# Patient Record
Sex: FEMALE | Race: WHITE | NOT HISPANIC OR LATINO | Employment: OTHER | URBAN - METROPOLITAN AREA
[De-identification: names, ages, dates, MRNs, and addresses within clinical notes are randomized per-mention and may not be internally consistent; named-entity substitution may affect disease eponyms.]

---

## 2017-03-13 DIAGNOSIS — R73.9 HYPERGLYCEMIA: ICD-10-CM

## 2017-03-13 DIAGNOSIS — E78.5 HYPERLIPIDEMIA, UNSPECIFIED HYPERLIPIDEMIA TYPE: Primary | ICD-10-CM

## 2017-03-13 DIAGNOSIS — I10 ESSENTIAL HYPERTENSION: ICD-10-CM

## 2017-03-22 LAB
ALBUMIN SERPL-MCNC: 4.1 G/DL (ref 3.5–5.2)
ALBUMIN/GLOB SERPL: 1.4 G/DL
ALP SERPL-CCNC: 135 U/L (ref 39–117)
ALT SERPL-CCNC: 29 U/L (ref 1–33)
AST SERPL-CCNC: 22 U/L (ref 1–32)
BASOPHILS # BLD AUTO: 0.02 10*3/MM3 (ref 0–0.2)
BASOPHILS NFR BLD AUTO: 0.3 % (ref 0–1.5)
BILIRUB SERPL-MCNC: 0.5 MG/DL (ref 0.1–1.2)
BUN SERPL-MCNC: 13 MG/DL (ref 8–23)
BUN/CREAT SERPL: 21.3 (ref 7–25)
CALCIUM SERPL-MCNC: 9.4 MG/DL (ref 8.6–10.5)
CHLORIDE SERPL-SCNC: 107 MMOL/L (ref 98–107)
CHOLEST SERPL-MCNC: 158 MG/DL (ref 100–199)
CO2 SERPL-SCNC: 21.2 MMOL/L (ref 22–29)
CREAT SERPL-MCNC: 0.61 MG/DL (ref 0.57–1)
EOSINOPHIL # BLD AUTO: 0.19 10*3/MM3 (ref 0–0.7)
EOSINOPHIL NFR BLD AUTO: 2.8 % (ref 0.3–6.2)
ERYTHROCYTE [DISTWIDTH] IN BLOOD BY AUTOMATED COUNT: 15 % (ref 11.7–13)
GLOBULIN SER CALC-MCNC: 2.9 GM/DL
GLUCOSE SERPL-MCNC: 93 MG/DL (ref 65–99)
HBA1C MFR BLD: 5.7 % (ref 4.8–5.6)
HCT VFR BLD AUTO: 38.1 % (ref 35.6–45.5)
HDL SERPL-SCNC: 47.4 UMOL/L
HDLC SERPL-MCNC: 81 MG/DL
HGB BLD-MCNC: 12.3 G/DL (ref 11.9–15.5)
IMM GRANULOCYTES # BLD: 0 10*3/MM3 (ref 0–0.03)
IMM GRANULOCYTES NFR BLD: 0 % (ref 0–0.5)
LDL SERPL QN: 21 NM
LDL SERPL-SCNC: 916 NMOL/L
LDL SMALL SERPL-SCNC: 419 NMOL/L
LDLC SERPL CALC-MCNC: 59 MG/DL (ref 0–99)
LYMPHOCYTES # BLD AUTO: 1.41 10*3/MM3 (ref 0.9–4.8)
LYMPHOCYTES NFR BLD AUTO: 20.6 % (ref 19.6–45.3)
MCH RBC QN AUTO: 30.6 PG (ref 26.9–32)
MCHC RBC AUTO-ENTMCNC: 32.3 G/DL (ref 32.4–36.3)
MCV RBC AUTO: 94.8 FL (ref 80.5–98.2)
MONOCYTES # BLD AUTO: 0.54 10*3/MM3 (ref 0.2–1.2)
MONOCYTES NFR BLD AUTO: 7.9 % (ref 5–12)
NEUTROPHILS # BLD AUTO: 4.67 10*3/MM3 (ref 1.9–8.1)
NEUTROPHILS NFR BLD AUTO: 68.4 % (ref 42.7–76)
PLATELET # BLD AUTO: 336 10*3/MM3 (ref 140–500)
POTASSIUM SERPL-SCNC: 4.1 MMOL/L (ref 3.5–5.2)
PROT SERPL-MCNC: 7 G/DL (ref 6–8.5)
RBC # BLD AUTO: 4.02 10*6/MM3 (ref 3.9–5.2)
SODIUM SERPL-SCNC: 145 MMOL/L (ref 136–145)
TRIGL SERPL-MCNC: 91 MG/DL (ref 0–149)
TSH SERPL DL<=0.005 MIU/L-ACNC: 2.63 MIU/ML (ref 0.27–4.2)
WBC # BLD AUTO: 6.83 10*3/MM3 (ref 4.5–10.7)

## 2017-03-28 ENCOUNTER — OFFICE VISIT (OUTPATIENT)
Dept: FAMILY MEDICINE CLINIC | Facility: CLINIC | Age: 77
End: 2017-03-28

## 2017-03-28 VITALS
SYSTOLIC BLOOD PRESSURE: 140 MMHG | OXYGEN SATURATION: 97 % | HEART RATE: 69 BPM | DIASTOLIC BLOOD PRESSURE: 80 MMHG | TEMPERATURE: 98.3 F | BODY MASS INDEX: 30.82 KG/M2 | HEIGHT: 60 IN | WEIGHT: 157 LBS

## 2017-03-28 DIAGNOSIS — F43.21 GRIEF REACTION: ICD-10-CM

## 2017-03-28 DIAGNOSIS — I10 ESSENTIAL HYPERTENSION: Primary | ICD-10-CM

## 2017-03-28 DIAGNOSIS — R73.9 HYPERGLYCEMIA: ICD-10-CM

## 2017-03-28 DIAGNOSIS — E78.5 HYPERLIPIDEMIA, UNSPECIFIED HYPERLIPIDEMIA TYPE: ICD-10-CM

## 2017-03-28 PROCEDURE — 99214 OFFICE O/P EST MOD 30 MIN: CPT | Performed by: INTERNAL MEDICINE

## 2017-03-28 NOTE — PROGRESS NOTES
Gerson Colon is a 77 y.o. female. Patient is here today for follow-up on her hypertension, hyperlipidemia and hyperglycemia.  She's generally been feeling okay but has been having some increased right knee pain.  Since I saw her last her 's  suddenly of a presumed heart attack.  She obviously is having some grief going on but seems to be managing okay.    Chief Complaint   Patient presents with   • Hyperlipidemia     HYPERGLYCEMIA, HTN- FOLLOW UP LABS          Vitals:    17 0933   BP: 140/80   Pulse: 69   Temp: 98.3 °F (36.8 °C)   SpO2: 97%     The following portions of the patient's history were reviewed and updated as appropriate: allergies, current medications, past family history, past medical history, past social history, past surgical history and problem list.    Past Medical History:   Diagnosis Date   • Hyperlipidemia    • Hypertension       No Known Allergies   Social History     Social History   • Marital status:      Spouse name: N/A   • Number of children: N/A   • Years of education: N/A     Occupational History   • Not on file.     Social History Main Topics   • Smoking status: Former Smoker   • Smokeless tobacco: Never Used   • Alcohol use No   • Drug use: Defer   • Sexual activity: Defer     Other Topics Concern   • Not on file     Social History Narrative        Current Outpatient Prescriptions:   •  aspirin 81 MG tablet, Take by mouth., Disp: , Rfl:   •  methotrexate 2.5 MG tablet, Take by mouth., Disp: , Rfl:   •  rosuvastatin (CRESTOR) 10 MG tablet, Take 1 tablet by mouth daily., Disp: 90 tablet, Rfl: 3     Objective     History of Present Illness     Review of Systems   Constitutional: Negative.    HENT: Negative.    Eyes: Negative.    Respiratory: Negative.    Cardiovascular: Negative.    Gastrointestinal: Negative.    Genitourinary: Negative.    Musculoskeletal: Negative.    Skin: Negative.    Neurological: Negative.    Psychiatric/Behavioral: Negative.         Physical Exam   Constitutional: She is oriented to person, place, and time. She appears well-developed and well-nourished.   Pleasant, cooperative and in no distress with a blood pressure 130/80   HENT:   Head: Normocephalic and atraumatic.   Eyes: Conjunctivae are normal.   Neck: Normal range of motion. Neck supple. No thyromegaly present.   Cardiovascular: Normal rate, regular rhythm and normal heart sounds.    Pulmonary/Chest: Effort normal and breath sounds normal. No respiratory distress. She has no wheezes. She has no rales.   Musculoskeletal: Normal range of motion. She exhibits no edema.   Neurological: She is alert and oriented to person, place, and time.   Skin: Skin is warm and dry.   Psychiatric: She has a normal mood and affect. Her behavior is normal.   Nursing note and vitals reviewed.      ASSESSMENT  overall the patient seems stable.  Her blood pressures reasonable and heart and lungs sound fine.  Her weight has increased about 4 pounds.  Her CBC was normal and CMP also was essentially fine.  Hemoglobin A1c is reasonable at 5.7.  Her lipid panel was excellent and TSH was normal.  The patient's having some grief as expected but is working through that okay.     Problem List Items Addressed This Visit        Cardiovascular and Mediastinum    Hyperlipidemia    Hypertension - Primary       Other    Hyperglycemia          PLAN  overall the patient seems stable.  I encouraged her to try and lose some weight.  She will go ahead and continue current medications and she declines mammogram and bone density tests.  I'd like to recheck her in 4 months with a CMP, NMR lipid panel, hemoglobin A1c    There are no Patient Instructions on file for this visit.  Return in about 4 months (around 7/28/2017) for with labs.

## 2017-06-06 ENCOUNTER — TELEPHONE (OUTPATIENT)
Dept: FAMILY MEDICINE CLINIC | Facility: CLINIC | Age: 77
End: 2017-06-06

## 2017-06-06 NOTE — TELEPHONE ENCOUNTER
DOESN'T LOOK LIKE WE WERE EVER MADE A AWARE THAT PATIENT STOPPED TAKING THIS MEDICATION- I LET HER KNOW SHE SHOULD RESTART IT BECAUSE IT HELPED CONTROL HER CHOLESTEROL PRETTY WELL THE LAST TIME IT WAS CHECKED.    ----- Message from Malaika Collazo sent at 6/6/2017 12:58 PM EDT -----  PT STATING SHE STOPPED TAKING HER STATIN ABOUT 3 MONTHS AGO WHEN SHE HAD A BAD FALL.  PT  IS WANTING TO KNOW IF IT IS OK TO JUST START TAKING IT AGAIN OR WHAT THE DR RECOMMENDS.    PLEASE CONTACT PT [phone]

## 2017-07-31 RX ORDER — ROSUVASTATIN CALCIUM 10 MG/1
TABLET, COATED ORAL
Qty: 90 TABLET | Refills: 2 | Status: SHIPPED | OUTPATIENT
Start: 2017-07-31 | End: 2018-05-30 | Stop reason: SDUPTHER

## 2017-08-03 DIAGNOSIS — R73.9 HYPERGLYCEMIA: ICD-10-CM

## 2017-08-03 DIAGNOSIS — E78.5 HYPERLIPIDEMIA, UNSPECIFIED HYPERLIPIDEMIA TYPE: ICD-10-CM

## 2017-08-09 LAB
ALBUMIN SERPL-MCNC: 4.2 G/DL (ref 3.5–5.2)
ALBUMIN/GLOB SERPL: 1.4 G/DL
ALP SERPL-CCNC: 102 U/L (ref 39–117)
ALT SERPL-CCNC: 15 U/L (ref 1–33)
AST SERPL-CCNC: 15 U/L (ref 1–32)
BILIRUB SERPL-MCNC: 0.5 MG/DL (ref 0.1–1.2)
BUN SERPL-MCNC: 16 MG/DL (ref 8–23)
BUN/CREAT SERPL: 23.2 (ref 7–25)
CALCIUM SERPL-MCNC: 9.4 MG/DL (ref 8.6–10.5)
CHLORIDE SERPL-SCNC: 106 MMOL/L (ref 98–107)
CHOLEST SERPL-MCNC: 176 MG/DL (ref 100–199)
CO2 SERPL-SCNC: 24.8 MMOL/L (ref 22–29)
CREAT SERPL-MCNC: 0.69 MG/DL (ref 0.57–1)
GLOBULIN SER CALC-MCNC: 3 GM/DL
GLUCOSE SERPL-MCNC: 98 MG/DL (ref 65–99)
HBA1C MFR BLD: 5.56 % (ref 4.8–5.6)
HDL SERPL-SCNC: 45.9 UMOL/L
HDLC SERPL-MCNC: 71 MG/DL
LDL SERPL QN: 21 NM
LDL SERPL-SCNC: 1290 NMOL/L
LDL SMALL SERPL-SCNC: 534 NMOL/L
LDLC SERPL CALC-MCNC: 87 MG/DL (ref 0–99)
POTASSIUM SERPL-SCNC: 4.4 MMOL/L (ref 3.5–5.2)
PROT SERPL-MCNC: 7.2 G/DL (ref 6–8.5)
SODIUM SERPL-SCNC: 144 MMOL/L (ref 136–145)
TRIGL SERPL-MCNC: 92 MG/DL (ref 0–149)

## 2017-08-15 ENCOUNTER — OFFICE VISIT (OUTPATIENT)
Dept: FAMILY MEDICINE CLINIC | Facility: CLINIC | Age: 77
End: 2017-08-15

## 2017-08-15 VITALS
HEIGHT: 60 IN | DIASTOLIC BLOOD PRESSURE: 80 MMHG | TEMPERATURE: 98.5 F | WEIGHT: 148.2 LBS | HEART RATE: 65 BPM | OXYGEN SATURATION: 97 % | SYSTOLIC BLOOD PRESSURE: 130 MMHG | BODY MASS INDEX: 29.09 KG/M2

## 2017-08-15 DIAGNOSIS — R01.1 SYSTOLIC MURMUR: Primary | ICD-10-CM

## 2017-08-15 DIAGNOSIS — I10 ESSENTIAL HYPERTENSION: ICD-10-CM

## 2017-08-15 DIAGNOSIS — E78.5 HYPERLIPIDEMIA, UNSPECIFIED HYPERLIPIDEMIA TYPE: ICD-10-CM

## 2017-08-15 DIAGNOSIS — R73.9 HYPERGLYCEMIA: ICD-10-CM

## 2017-08-15 PROCEDURE — 99214 OFFICE O/P EST MOD 30 MIN: CPT | Performed by: INTERNAL MEDICINE

## 2017-08-15 NOTE — PROGRESS NOTES
Gerson Colon is a 77 y.o. female. Patient is here today for follow-up on her hypertension, hyperlipidemia, hyperglycemia and a systolic heart murmur.  She generally is been doing well.  Her  did die not too long ago when she seems to be recovering okay from that.  She's lost about 9 pounds since last visit and is eating better.  She's had no chest pain, shortness of breath, edema or significant myalgias.  She does have problems remembering to take the Crestor all the time.    Chief Complaint   Patient presents with   • Hyperlipidemia     HYPERGLYCEMIA, HTN- FOLLOW UP LABS          Vitals:    08/15/17 0945   BP: 130/80   Pulse: 65   Temp: 98.5 °F (36.9 °C)   SpO2: 97%     The following portions of the patient's history were reviewed and updated as appropriate: allergies, current medications, past family history, past medical history, past social history, past surgical history and problem list.    Past Medical History:   Diagnosis Date   • Hyperlipidemia    • Hypertension       No Known Allergies   Social History     Social History   • Marital status:      Spouse name: N/A   • Number of children: N/A   • Years of education: N/A     Occupational History   • Not on file.     Social History Main Topics   • Smoking status: Former Smoker   • Smokeless tobacco: Never Used   • Alcohol use No   • Drug use: Defer   • Sexual activity: Defer     Other Topics Concern   • Not on file     Social History Narrative        Current Outpatient Prescriptions:   •  aspirin 81 MG tablet, Take by mouth., Disp: , Rfl:   •  methotrexate 2.5 MG tablet, Take by mouth., Disp: , Rfl:   •  rosuvastatin (CRESTOR) 10 MG tablet, TAKE ONE TABLET BY MOUTH DAILY, Disp: 90 tablet, Rfl: 2     Objective     History of Present Illness     Review of Systems   Constitutional: Negative.    HENT: Negative.    Eyes: Negative.    Respiratory: Negative.    Cardiovascular: Negative.    Gastrointestinal: Negative.    Genitourinary:  Negative.    Musculoskeletal: Negative.    Skin: Negative.    Neurological: Negative.    Psychiatric/Behavioral: Negative.        Physical Exam   Constitutional: She appears well-developed and well-nourished.   Pleasant, cooperative and in no distress with a blood pressure 130/80   HENT:   Head: Normocephalic and atraumatic.   Eyes: Conjunctivae are normal.   Neck: Normal range of motion. Neck supple. No thyromegaly present.   Cardiovascular: Normal rate, regular rhythm and normal heart sounds.    Pulmonary/Chest: Effort normal and breath sounds normal. No respiratory distress. She has no wheezes. She has no rales.   Musculoskeletal: Normal range of motion. She exhibits no edema.   Neurological: She is alert.   Skin: Skin is warm and dry.   Psychiatric: She has a normal mood and affect. Her behavior is normal.   Nursing note and vitals reviewed.      ASSESSMENT  overall the patient generally seems stable.  Her blood pressures fine and heart and lungs sound fine.  She is overweight but has lost about 9 pounds since her last visit.  Her CMP was completely normal and hemoglobin A1c was also in the normal range.  Her lipid panel is reasonable with a total cholesterol 176, HDL of 71, LDL of 87 and a particle number in the moderate range.     Problem List Items Addressed This Visit        Cardiovascular and Mediastinum    Hyperlipidemia    Hypertension    Systolic murmur - Primary       Other    Hyperglycemia          PLAN  The patient will continue her current medicines and I would like to recheck her in 4 months with a CMP, NMR lipid panel    There are no Patient Instructions on file for this visit.  Return in about 4 months (around 12/15/2017).

## 2017-11-22 DIAGNOSIS — E78.5 HYPERLIPIDEMIA, UNSPECIFIED HYPERLIPIDEMIA TYPE: Primary | ICD-10-CM

## 2017-12-31 LAB
ALBUMIN SERPL-MCNC: 3.8 G/DL (ref 3.5–5.2)
ALBUMIN/GLOB SERPL: 1.2 G/DL
ALP SERPL-CCNC: 109 U/L (ref 39–117)
ALT SERPL-CCNC: 16 U/L (ref 1–33)
AST SERPL-CCNC: 18 U/L (ref 1–32)
BILIRUB SERPL-MCNC: 0.4 MG/DL (ref 0.1–1.2)
BUN SERPL-MCNC: 12 MG/DL (ref 8–23)
BUN/CREAT SERPL: 18.5 (ref 7–25)
CALCIUM SERPL-MCNC: 9.3 MG/DL (ref 8.6–10.5)
CHLORIDE SERPL-SCNC: 105 MMOL/L (ref 98–107)
CHOLEST SERPL-MCNC: 194 MG/DL (ref 100–199)
CO2 SERPL-SCNC: 25.6 MMOL/L (ref 22–29)
CREAT SERPL-MCNC: 0.65 MG/DL (ref 0.57–1)
GLOBULIN SER CALC-MCNC: 3.1 GM/DL
GLUCOSE SERPL-MCNC: 99 MG/DL (ref 65–99)
HDL SERPL-SCNC: 45.5 UMOL/L
HDLC SERPL-MCNC: 70 MG/DL
LDL SERPL QN: 21.5 NM
LDL SERPL-SCNC: 1328 NMOL/L
LDL SMALL SERPL-SCNC: 477 NMOL/L
LDLC SERPL CALC-MCNC: 95 MG/DL (ref 0–99)
POTASSIUM SERPL-SCNC: 4.2 MMOL/L (ref 3.5–5.2)
PROT SERPL-MCNC: 6.9 G/DL (ref 6–8.5)
SODIUM SERPL-SCNC: 143 MMOL/L (ref 136–145)
TRIGL SERPL-MCNC: 144 MG/DL (ref 0–149)

## 2018-01-17 ENCOUNTER — OFFICE VISIT (OUTPATIENT)
Dept: FAMILY MEDICINE CLINIC | Facility: CLINIC | Age: 78
End: 2018-01-17

## 2018-01-17 VITALS
HEART RATE: 65 BPM | BODY MASS INDEX: 29.45 KG/M2 | TEMPERATURE: 98 F | WEIGHT: 150 LBS | SYSTOLIC BLOOD PRESSURE: 130 MMHG | OXYGEN SATURATION: 98 % | HEIGHT: 60 IN | DIASTOLIC BLOOD PRESSURE: 80 MMHG

## 2018-01-17 DIAGNOSIS — R01.1 SYSTOLIC MURMUR: ICD-10-CM

## 2018-01-17 DIAGNOSIS — I10 ESSENTIAL HYPERTENSION: ICD-10-CM

## 2018-01-17 DIAGNOSIS — Z23 NEED FOR IMMUNIZATION AGAINST INFLUENZA: Primary | ICD-10-CM

## 2018-01-17 DIAGNOSIS — R73.9 HYPERGLYCEMIA: ICD-10-CM

## 2018-01-17 DIAGNOSIS — E78.5 HYPERLIPIDEMIA, UNSPECIFIED HYPERLIPIDEMIA TYPE: ICD-10-CM

## 2018-01-17 PROCEDURE — 99214 OFFICE O/P EST MOD 30 MIN: CPT | Performed by: INTERNAL MEDICINE

## 2018-01-17 PROCEDURE — G0008 ADMIN INFLUENZA VIRUS VAC: HCPCS | Performed by: INTERNAL MEDICINE

## 2018-01-17 PROCEDURE — 90662 IIV NO PRSV INCREASED AG IM: CPT | Performed by: INTERNAL MEDICINE

## 2018-01-17 RX ORDER — AMOXICILLIN 500 MG/1
CAPSULE ORAL
COMMUNITY
Start: 2018-01-15 | End: 2018-02-14

## 2018-01-17 NOTE — PROGRESS NOTES
Gerson Colon is a 77 y.o. female. Patient is here today for follow-up on her hypertension, hyperlipidemia, hyperglycemia and she also has a systolic heart murmur.  She's been feeling okay and is had no significant chest pain, shortness of breath, edema or myalgias.  She does admit to missing the Crestor on occasion as she hasn't the past.  She also needs flu shot.     Chief Complaint   Patient presents with   • Hyperlipidemia     HTN, HYPERGLYCEMIA- FOLLOW UP LABS          Vitals:    01/17/18 0838   BP: 130/80   Pulse: 65   Temp: 98 °F (36.7 °C)   SpO2: 98%     The following portions of the patient's history were reviewed and updated as appropriate: allergies, current medications, past family history, past medical history, past social history, past surgical history and problem list.    Past Medical History:   Diagnosis Date   • Hyperlipidemia    • Hypertension       No Known Allergies   Social History     Social History   • Marital status:      Spouse name: N/A   • Number of children: N/A   • Years of education: N/A     Occupational History   • Not on file.     Social History Main Topics   • Smoking status: Former Smoker   • Smokeless tobacco: Never Used   • Alcohol use No   • Drug use: Defer   • Sexual activity: Defer     Other Topics Concern   • Not on file     Social History Narrative        Current Outpatient Prescriptions:   •  amoxicillin (AMOXIL) 500 MG capsule, , Disp: , Rfl:   •  methotrexate 2.5 MG tablet, Take by mouth., Disp: , Rfl:   •  rosuvastatin (CRESTOR) 10 MG tablet, TAKE ONE TABLET BY MOUTH DAILY, Disp: 90 tablet, Rfl: 2  •  aspirin 81 MG tablet, Take by mouth., Disp: , Rfl:      Objective     History of Present Illness     Review of Systems   Constitutional: Negative.    HENT: Negative.    Eyes: Negative.    Respiratory: Negative.    Cardiovascular: Negative.    Gastrointestinal: Negative.    Endocrine: Negative.    Genitourinary: Negative.    Musculoskeletal: Negative.     Skin: Negative.    Neurological: Negative.    Psychiatric/Behavioral: Negative.        Physical Exam   Constitutional: She is oriented to person, place, and time. She appears well-developed and well-nourished.   Pleasant, cooperative no distress with a blood pressure 140/80   HENT:   Head: Normocephalic and atraumatic.   Eyes: Conjunctivae are normal. Pupils are equal, round, and reactive to light. No scleral icterus.   Neck: Normal range of motion. Neck supple. No thyromegaly present.   Cardiovascular: Normal rate and regular rhythm.    Murmur heard.  The patient has a 2 to 3/6 systolic murmur heard best the upper right sternal border   Pulmonary/Chest: Effort normal and breath sounds normal. No respiratory distress. She has no wheezes. She has no rales.   Musculoskeletal: Normal range of motion. She exhibits no edema.   Neurological: She is alert and oriented to person, place, and time.   Skin: Skin is warm and dry.   Psychiatric: She has a normal mood and affect. Her behavior is normal.   Nursing note and vitals reviewed.      ASSESSMENT  CMP was completely normal.  Patient's lipid panel is a bit higher with a total cholesterol 194, HDL of 70, LDL 95 with the particle number into the borderline range at 1328.  The patient's last echocardiogram was in 2014  #1-hypertension, adequate control  #2-hyperlipidemia, not quite as well controlled patient is erratic on taking her statin  #3-hyperglycemia, normal Sugar today  #4-systolic heart murmur, last echo in 2014       Problem List Items Addressed This Visit        Cardiovascular and Mediastinum    Hyperlipidemia    Hypertension    Relevant Orders    Adult Transthoracic Echo Complete W/ Cont if Necessary Per Protocol    Systolic murmur    Relevant Orders    Adult Transthoracic Echo Complete W/ Cont if Necessary Per Protocol       Other    Hyperglycemia      Other Visit Diagnoses     Need for immunization against influenza    -  Primary    Relevant Orders    Flu  Vaccine High Dose PF 65YR+          PLAN  The patient received a flu shot today.  I'm going to order an echocardiogram because of the systolic heart murmur.  I encouraged patient to take the Crestor regularly and I would like to recheck her in 4 months with a CBC, CMP, NMR lipid panel, hemoglobin A1c and TSH    There are no Patient Instructions on file for this visit.  Return in about 4 months (around 5/17/2018) for with labs.

## 2018-01-31 ENCOUNTER — HOSPITAL ENCOUNTER (OUTPATIENT)
Dept: CARDIOLOGY | Facility: HOSPITAL | Age: 78
Discharge: HOME | End: 2018-01-31
Admitting: INTERNAL MEDICINE

## 2018-01-31 DIAGNOSIS — R01.1 SYSTOLIC MURMUR: ICD-10-CM

## 2018-01-31 DIAGNOSIS — I10 ESSENTIAL HYPERTENSION: ICD-10-CM

## 2018-01-31 LAB
ASCENDING AORTA: 2.9 CM
BH CV ECHO MEAS - ACS: 1.7 CM
BH CV ECHO MEAS - AO MEAN PG (FULL): 3 MMHG
BH CV ECHO MEAS - AO MEAN PG: 8 MMHG
BH CV ECHO MEAS - AO ROOT AREA (BSA CORRECTED): 1.4
BH CV ECHO MEAS - AO ROOT AREA: 4.2 CM^2
BH CV ECHO MEAS - AO ROOT DIAM: 2.3 CM
BH CV ECHO MEAS - AO V2 MEAN: 130 CM/SEC
BH CV ECHO MEAS - AO V2 VTI: 43.9 CM
BH CV ECHO MEAS - ASC AORTA: 2.9 CM
BH CV ECHO MEAS - AVA(I,A): 1.9 CM^2
BH CV ECHO MEAS - AVA(I,D): 1.9 CM^2
BH CV ECHO MEAS - BSA(HAYCOCK): 1.7 M^2
BH CV ECHO MEAS - BSA: 1.7 M^2
BH CV ECHO MEAS - BZI_BMI: 29.3 KILOGRAMS/M^2
BH CV ECHO MEAS - BZI_METRIC_HEIGHT: 152.4 CM
BH CV ECHO MEAS - BZI_METRIC_WEIGHT: 68 KG
BH CV ECHO MEAS - CONTRAST EF (2CH): 73.9 ML/M^2
BH CV ECHO MEAS - CONTRAST EF 4CH: 73.6 ML/M^2
BH CV ECHO MEAS - EDV(CUBED): 68.9 ML
BH CV ECHO MEAS - EDV(MOD-SP2): 69 ML
BH CV ECHO MEAS - EDV(MOD-SP4): 53 ML
BH CV ECHO MEAS - EDV(TEICH): 74.2 ML
BH CV ECHO MEAS - EF(CUBED): 77.3 %
BH CV ECHO MEAS - EF(MOD-SP2): 73.9 %
BH CV ECHO MEAS - EF(MOD-SP4): 73.6 %
BH CV ECHO MEAS - EF(TEICH): 69.9 %
BH CV ECHO MEAS - ESV(CUBED): 15.6 ML
BH CV ECHO MEAS - ESV(MOD-SP2): 18 ML
BH CV ECHO MEAS - ESV(MOD-SP4): 14 ML
BH CV ECHO MEAS - ESV(TEICH): 22.3 ML
BH CV ECHO MEAS - FS: 39 %
BH CV ECHO MEAS - IVS/LVPW: 1.1
BH CV ECHO MEAS - IVSD: 1.3 CM
BH CV ECHO MEAS - LAT PEAK E' VEL: 7 CM/SEC
BH CV ECHO MEAS - LV DIASTOLIC VOL/BSA (35-75): 32.1 ML/M^2
BH CV ECHO MEAS - LV MASS(C)D: 182.5 GRAMS
BH CV ECHO MEAS - LV MASS(C)DI: 110.5 GRAMS/M^2
BH CV ECHO MEAS - LV MEAN PG: 5 MMHG
BH CV ECHO MEAS - LV SYSTOLIC VOL/BSA (12-30): 8.5 ML/M^2
BH CV ECHO MEAS - LV V1 MEAN: 97.2 CM/SEC
BH CV ECHO MEAS - LV V1 VTI: 36.6 CM
BH CV ECHO MEAS - LVIDD: 4.1 CM
BH CV ECHO MEAS - LVIDS: 2.5 CM
BH CV ECHO MEAS - LVLD AP2: 7.3 CM
BH CV ECHO MEAS - LVLD AP4: 6.2 CM
BH CV ECHO MEAS - LVLS AP2: 5.4 CM
BH CV ECHO MEAS - LVLS AP4: 5.2 CM
BH CV ECHO MEAS - LVOT AREA (M): 2.3 CM^2
BH CV ECHO MEAS - LVOT AREA: 2.3 CM^2
BH CV ECHO MEAS - LVOT DIAM: 1.7 CM
BH CV ECHO MEAS - LVPWD: 1.2 CM
BH CV ECHO MEAS - MED PEAK E' VEL: 5 CM/SEC
BH CV ECHO MEAS - MV A DUR: 0.14 SEC
BH CV ECHO MEAS - MV A MAX VEL: 94.3 CM/SEC
BH CV ECHO MEAS - MV DEC SLOPE: 478 CM/SEC^2
BH CV ECHO MEAS - MV DEC TIME: 0.27 SEC
BH CV ECHO MEAS - MV E MAX VEL: 102 CM/SEC
BH CV ECHO MEAS - MV E/A: 1.1
BH CV ECHO MEAS - MV MEAN PG: 2 MMHG
BH CV ECHO MEAS - MV P1/2T MAX VEL: 118 CM/SEC
BH CV ECHO MEAS - MV P1/2T: 72.3 MSEC
BH CV ECHO MEAS - MV V2 MEAN: 68.2 CM/SEC
BH CV ECHO MEAS - MV V2 VTI: 41.3 CM
BH CV ECHO MEAS - MVA P1/2T LCG: 1.9 CM^2
BH CV ECHO MEAS - MVA(P1/2T): 3 CM^2
BH CV ECHO MEAS - MVA(VTI): 2 CM^2
BH CV ECHO MEAS - PA ACC SLOPE: 67 CM/SEC^2
BH CV ECHO MEAS - PA ACC TIME: 0.08 SEC
BH CV ECHO MEAS - PA MAX PG (FULL): 3.3 MMHG
BH CV ECHO MEAS - PA MAX PG: 5.3 MMHG
BH CV ECHO MEAS - PA PR(ACCEL): 42.6 MMHG
BH CV ECHO MEAS - PA V2 MAX: 115 CM/SEC
BH CV ECHO MEAS - PI END-D VEL: 92.3 CM/SEC
BH CV ECHO MEAS - PULM A REVS DUR: 0.11 SEC
BH CV ECHO MEAS - PULM A REVS VEL: 36 CM/SEC
BH CV ECHO MEAS - PULM DIAS VEL: 56.8 CM/SEC
BH CV ECHO MEAS - PULM S/D: 1.3
BH CV ECHO MEAS - PULM SYS VEL: 74 CM/SEC
BH CV ECHO MEAS - PVA(V,A): 2.1 CM^2
BH CV ECHO MEAS - PVA(V,D): 2.1 CM^2
BH CV ECHO MEAS - QP/QS: 0.68
BH CV ECHO MEAS - RAP SYSTOLE: 3 MMHG
BH CV ECHO MEAS - RV MAX PG: 2 MMHG
BH CV ECHO MEAS - RV MEAN PG: 1 MMHG
BH CV ECHO MEAS - RV V1 MAX: 70.7 CM/SEC
BH CV ECHO MEAS - RV V1 MEAN: 52.6 CM/SEC
BH CV ECHO MEAS - RV V1 VTI: 16.3 CM
BH CV ECHO MEAS - RVOT AREA: 3.5 CM^2
BH CV ECHO MEAS - RVOT DIAM: 2.1 CM
BH CV ECHO MEAS - RVSP: 30.5 MMHG
BH CV ECHO MEAS - SI(AO): 110.4 ML/M^2
BH CV ECHO MEAS - SI(CUBED): 32.3 ML/M^2
BH CV ECHO MEAS - SI(LVOT): 50.3 ML/M^2
BH CV ECHO MEAS - SI(MOD-SP2): 30.9 ML/M^2
BH CV ECHO MEAS - SI(MOD-SP4): 23.6 ML/M^2
BH CV ECHO MEAS - SI(TEICH): 31.4 ML/M^2
BH CV ECHO MEAS - SUP REN AO DIAM: 1.49 CM
BH CV ECHO MEAS - SV(AO): 182.4 ML
BH CV ECHO MEAS - SV(CUBED): 53.3 ML
BH CV ECHO MEAS - SV(LVOT): 83.1 ML
BH CV ECHO MEAS - SV(MOD-SP2): 51 ML
BH CV ECHO MEAS - SV(MOD-SP4): 39 ML
BH CV ECHO MEAS - SV(RVOT): 56.5 ML
BH CV ECHO MEAS - SV(TEICH): 51.9 ML
BH CV ECHO MEAS - TAPSE (>1.6): 2.1 CM2
BH CV ECHO MEAS - TR MAX VEL: 262 CM/SEC
BH CV VAS BP RIGHT ARM: NORMAL MMHG
BH CV XLRA - RV BASE: 2.8 CM
BH CV XLRA - TDI S': 13 CM/SEC
E/E' RATIO: 17
LEFT ATRIUM VOLUME INDEX: 22 ML/M2

## 2018-01-31 PROCEDURE — 93306 TTE W/DOPPLER COMPLETE: CPT | Performed by: INTERNAL MEDICINE

## 2018-01-31 PROCEDURE — 93306 TTE W/DOPPLER COMPLETE: CPT

## 2018-02-14 ENCOUNTER — OFFICE VISIT (OUTPATIENT)
Dept: FAMILY MEDICINE CLINIC | Facility: CLINIC | Age: 78
End: 2018-02-14

## 2018-02-14 VITALS
HEIGHT: 61 IN | WEIGHT: 148 LBS | SYSTOLIC BLOOD PRESSURE: 134 MMHG | HEART RATE: 77 BPM | BODY MASS INDEX: 27.94 KG/M2 | TEMPERATURE: 98.3 F | DIASTOLIC BLOOD PRESSURE: 66 MMHG | OXYGEN SATURATION: 98 %

## 2018-02-14 DIAGNOSIS — B02.9 HERPES ZOSTER WITHOUT COMPLICATION: Primary | ICD-10-CM

## 2018-02-14 DIAGNOSIS — R01.1 SYSTOLIC MURMUR: ICD-10-CM

## 2018-02-14 DIAGNOSIS — I10 ESSENTIAL HYPERTENSION: ICD-10-CM

## 2018-02-14 PROCEDURE — 99214 OFFICE O/P EST MOD 30 MIN: CPT | Performed by: INTERNAL MEDICINE

## 2018-02-14 NOTE — PROGRESS NOTES
Gerson Colon is a 77 y.o. female. Patient is here today for follow-up on an outbreak of shingles involving her left for head above the eye involvement.  This was diagnosed 5 days ago when she was started on Valtrex.  She's feeling better.  The lesions are scabbing up and she's had no new lesions showing.  She did see her eye doctor and has no eye involvement.  She also is here to review the echocardiogram done because of the systolic heart murmur.  Chief Complaint   Patient presents with   • Herpes Zoster     [place] [facility] [place]          Vitals:    02/14/18 0923   BP: 134/66   Pulse: 77   Temp: 98.3 °F (36.8 °C)   SpO2: 98%     The following portions of the patient's history were reviewed and updated as appropriate: allergies, current medications, past family history, past medical history, past social history, past surgical history and problem list.    Past Medical History:   Diagnosis Date   • Hyperlipidemia    • Hypertension       No Known Allergies   Social History     Social History   • Marital status:      Spouse name: N/A   • Number of children: N/A   • Years of education: N/A     Occupational History   • Not on file.     Social History Main Topics   • Smoking status: Former Smoker   • Smokeless tobacco: Never Used   • Alcohol use No   • Drug use: Defer   • Sexual activity: Defer     Other Topics Concern   • Not on file     Social History Narrative        Current Outpatient Prescriptions:   •  aspirin 81 MG tablet, Take by mouth., Disp: , Rfl:   •  methotrexate 2.5 MG tablet, Take by mouth., Disp: , Rfl:   •  rosuvastatin (CRESTOR) 10 MG tablet, TAKE ONE TABLET BY MOUTH DAILY, Disp: 90 tablet, Rfl: 2  •  valACYclovir (VALTREX) 1000 MG tablet, Take 1 tablet by mouth 3 (Three) Times a Day., Disp: 21 tablet, Rfl: 0     Objective     History of Present Illness     Review of Systems   Constitutional: Negative.    HENT: Negative.    Eyes: Negative.    Respiratory: Negative.     Cardiovascular: Negative.    Gastrointestinal: Negative.    Genitourinary: Negative.    Musculoskeletal: Negative.    Neurological: Negative.    Psychiatric/Behavioral: Negative.        Physical Exam   Constitutional: She appears well-developed and well-nourished.   Pleasant, cooperative no distress with a normal blood pressure   HENT:   Head: Normocephalic and atraumatic.   Eyes: Conjunctivae are normal. Pupils are equal, round, and reactive to light. Right eye exhibits no discharge. Left eye exhibits no discharge. No scleral icterus.   Neck: Normal range of motion. Neck supple.   Cardiovascular: Normal rate, regular rhythm and normal heart sounds.    Pulmonary/Chest: Effort normal and breath sounds normal. No respiratory distress. She has no wheezes. She has no rales.   Skin: Skin is warm and dry. Rash noted.   There is a healing vesicular area in the left for head above the eye consistent with shingles that is mostly dry and scabbed   Nursing note and vitals reviewed.      ASSESSMENT  echocardiogram report was reviewed and there was some slight left ventricular hypertrophy and some grade 2 left diastolic dysfunction with a normal ejection fraction of 66-70%.  No valvular disorder was seen.  #1-resolving shingles on the left forehead with no eye involvement and no apparent complications  #2-systolic heart murmur, nonvalvular  #3-hypertension, well controlled     Problem List Items Addressed This Visit        Cardiovascular and Mediastinum    Hypertension    Systolic murmur       Other    Herpes zoster without complication - Primary          PLAN  The patient will finish up the Valtrex.  She is already scheduled for follow-up with laboratory testing down the road.    There are no Patient Instructions on file for this visit.  No Follow-up on file.

## 2018-02-26 ENCOUNTER — OFFICE VISIT (OUTPATIENT)
Dept: FAMILY MEDICINE CLINIC | Facility: CLINIC | Age: 78
End: 2018-02-26

## 2018-02-26 VITALS
BODY MASS INDEX: 29.25 KG/M2 | RESPIRATION RATE: 16 BRPM | TEMPERATURE: 97.8 F | SYSTOLIC BLOOD PRESSURE: 122 MMHG | HEART RATE: 73 BPM | WEIGHT: 149 LBS | HEIGHT: 60 IN | OXYGEN SATURATION: 99 % | DIASTOLIC BLOOD PRESSURE: 70 MMHG

## 2018-02-26 DIAGNOSIS — Z00.00 MEDICARE ANNUAL WELLNESS VISIT, SUBSEQUENT: Primary | ICD-10-CM

## 2018-02-26 PROCEDURE — G0439 PPPS, SUBSEQ VISIT: HCPCS | Performed by: NURSE PRACTITIONER

## 2018-02-26 NOTE — PROGRESS NOTES
QUICK REFERENCE INFORMATION:  The ABCs of the Annual Wellness Visit    Subsequent Medicare Wellness Visit    HEALTH RISK ASSESSMENT    1940    Recent Hospitalizations:  No hospitalization(s) within the last year..        Current Medical Providers:  Patient Care Team:  Evan Borja MD as PCP - General  Beto Null MD as PCP - Claims Attributed        Smoking Status:  History   Smoking Status   • Former Smoker   Smokeless Tobacco   • Never Used       Alcohol Consumption:  History   Alcohol Use No       Depression Screen:   PHQ-2/PHQ-9 Depression Screening 2/26/2018   Little interest or pleasure in doing things 0   Feeling down, depressed, or hopeless 0   Total Score 0       Health Habits and Functional and Cognitive Screening:  Functional & Cognitive Status 2/26/2018   Do you have difficulty preparing food and eating? No   Do you have difficulty bathing yourself, getting dressed or grooming yourself? No   Do you have difficulty using the toilet? No   Do you have difficulty moving around from place to place? No   Do you have trouble with steps or getting out of a bed or a chair? No   In the past year have you fallen or experienced a near fall? Yes   Current Diet Well Balanced Diet   Dental Exam Up to date   Eye Exam Up to date   Exercise (times per week) 7 times per week   Current Exercise Activities Include Walking   Do you need help using the phone?  No   Are you deaf or do you have serious difficulty hearing?  Yes   Do you need help with transportation? Yes   Do you need help preparing meals?  No   Do you need help with housework?  No   Do you need help with laundry? No   Do you need help taking your medications? No   Do you need help managing money? No   Have you felt unusual stress, anger or loneliness in the last month? No   Who do you live with? Alone   If you need help, do you have trouble finding someone available to you? No   Have you been bothered in the last four weeks by sexual problems? No    Do you have difficulty concentrating, remembering or making decisions? No           Does the patient have evidence of cognitive impairment? No    Aspirin use counseling: Does not need ASA (and currently is not on it)      Recent Lab Results:  CMP:  Lab Results   Component Value Date    GLU 99 12/29/2017    BUN 12 12/29/2017    CREATININE 0.65 12/29/2017    EGFRIFNONA 88 12/29/2017    EGFRIFAFRI 107 12/29/2017    BCR 18.5 12/29/2017     12/29/2017    K 4.2 12/29/2017    CO2 25.6 12/29/2017    CALCIUM 9.3 12/29/2017    PROTENTOTREF 6.9 12/29/2017    ALBUMIN 3.80 12/29/2017    LABGLOBREF 3.1 12/29/2017    LABIL2 1.2 12/29/2017    BILITOT 0.4 12/29/2017    ALKPHOS 109 12/29/2017    AST 18 12/29/2017    ALT 16 12/29/2017     Lipid Panel:  Lab Results   Component Value Date    TRIG 144 12/29/2017     HbA1c:  Lab Results   Component Value Date    HGBA1C 5.56 08/08/2017       Visual Acuity:  No exam data present    Age-appropriate Screening Schedule:  Refer to the list below for future screening recommendations based on patient's age, sex and/or medical conditions. Orders for these recommended tests are listed in the plan section. The patient has been provided with a written plan.    Health Maintenance   Topic Date Due   • LIPID PANEL  12/29/2018   • MAMMOGRAM  03/28/2019   • DXA SCAN  03/28/2019   • TDAP/TD VACCINES (2 - Td) 09/08/2025   • INFLUENZA VACCINE  Completed   • PNEUMOCOCCAL VACCINES (65+ LOW/MEDIUM RISK)  Completed   • ZOSTER VACCINE  Completed        Subjective   History of Present Illness    Monica Colon is a 77 y.o. female who presents for an Subsequent Wellness Visit.    The following portions of the patient's history were reviewed and updated as appropriate: allergies, current medications, past family history, past medical history, past social history, past surgical history and problem list.    Outpatient Medications Prior to Visit   Medication Sig Dispense Refill   • methotrexate 2.5 MG tablet Take  "by mouth.     • rosuvastatin (CRESTOR) 10 MG tablet TAKE ONE TABLET BY MOUTH DAILY 90 tablet 2   • aspirin 81 MG tablet Take by mouth.     • valACYclovir (VALTREX) 1000 MG tablet Take 1 tablet by mouth 3 (Three) Times a Day. 21 tablet 0     No facility-administered medications prior to visit.        Patient Active Problem List   Diagnosis   • Hyperglycemia   • Hyperlipidemia   • Hypertension   • Osteopenia   • Systolic murmur   • Herpes zoster without complication       Advance Care Planning:  .    Identification of Risk Factors:  Risk factors include: cardiovascular risk and increased fall risk.    Review of Systems    Compared to one year ago, the patient feels her physical health is the same.  Compared to one year ago, the patient feels her mental health is the same.    Objective     Physical Exam    Vitals:    02/26/18 1303   BP: 122/70   Pulse: 73   Resp: 16   Temp: 97.8 °F (36.6 °C)   SpO2: 99%   Weight: 67.6 kg (149 lb)   Height: 152.4 cm (60\")   PainSc:   4       Body mass index is 29.1 kg/(m^2).  Discussed the patient's BMI with her. BMI is above normal parameters. Follow-up plan includes:  no follow-up required.    Assessment/Plan   Patient Self-Management and Personalized Health Advice  The patient has been provided with information about: diet, exercise, weight management, prevention of cardiac or vascular disease and fall prevention and preventive services including:   · Fall Risk assessment done, declines mammogram and dexa .    Visit Diagnoses:    ICD-10-CM ICD-9-CM   1. Medicare annual wellness visit, subsequent Z00.00 V70.0       No orders of the defined types were placed in this encounter.      Outpatient Encounter Prescriptions as of 2/26/2018   Medication Sig Dispense Refill   • methotrexate 2.5 MG tablet Take by mouth.     • rosuvastatin (CRESTOR) 10 MG tablet TAKE ONE TABLET BY MOUTH DAILY 90 tablet 2   • [DISCONTINUED] aspirin 81 MG tablet Take by mouth.     • [DISCONTINUED] valACYclovir " (VALTREX) 1000 MG tablet Take 1 tablet by mouth 3 (Three) Times a Day. 21 tablet 0     No facility-administered encounter medications on file as of 2/26/2018.        Reviewed use of high risk medication in the elderly: yes  Reviewed for potential of harmful drug interactions in the elderly: yes    Follow Up:  No Follow-up on file.     An After Visit Summary and PPPS with all of these plans were given to the patient.

## 2018-05-11 DIAGNOSIS — E78.5 HYPERLIPIDEMIA, UNSPECIFIED HYPERLIPIDEMIA TYPE: ICD-10-CM

## 2018-05-11 DIAGNOSIS — R73.9 HYPERGLYCEMIA: ICD-10-CM

## 2018-05-11 DIAGNOSIS — I10 ESSENTIAL HYPERTENSION: ICD-10-CM

## 2018-05-25 LAB
ALBUMIN SERPL-MCNC: 4.3 G/DL (ref 3.5–5.2)
ALBUMIN/GLOB SERPL: 1.4 G/DL
ALP SERPL-CCNC: 116 U/L (ref 39–117)
ALT SERPL-CCNC: 13 U/L (ref 1–33)
AST SERPL-CCNC: 13 U/L (ref 1–32)
BASOPHILS # BLD AUTO: 0.02 10*3/MM3 (ref 0–0.2)
BASOPHILS NFR BLD AUTO: 0.3 % (ref 0–1.5)
BILIRUB SERPL-MCNC: 0.6 MG/DL (ref 0.1–1.2)
BUN SERPL-MCNC: 15 MG/DL (ref 8–23)
BUN/CREAT SERPL: 21.1 (ref 7–25)
CALCIUM SERPL-MCNC: 9.8 MG/DL (ref 8.6–10.5)
CHLORIDE SERPL-SCNC: 103 MMOL/L (ref 98–107)
CHOLEST SERPL-MCNC: 244 MG/DL (ref 100–199)
CO2 SERPL-SCNC: 25.1 MMOL/L (ref 22–29)
CREAT SERPL-MCNC: 0.71 MG/DL (ref 0.57–1)
EOSINOPHIL # BLD AUTO: 0.14 10*3/MM3 (ref 0–0.7)
EOSINOPHIL NFR BLD AUTO: 2.4 % (ref 0.3–6.2)
ERYTHROCYTE [DISTWIDTH] IN BLOOD BY AUTOMATED COUNT: 14.8 % (ref 11.7–13)
GFR SERPLBLD CREATININE-BSD FMLA CKD-EPI: 79 ML/MIN/1.73
GFR SERPLBLD CREATININE-BSD FMLA CKD-EPI: 96 ML/MIN/1.73
GLOBULIN SER CALC-MCNC: 3 GM/DL
GLUCOSE SERPL-MCNC: 93 MG/DL (ref 65–99)
HBA1C MFR BLD: 5.1 % (ref 4.8–5.6)
HCT VFR BLD AUTO: 38.2 % (ref 35.6–45.5)
HDL SERPL-SCNC: 38.1 UMOL/L
HDLC SERPL-MCNC: 67 MG/DL
HGB BLD-MCNC: 12.4 G/DL (ref 11.9–15.5)
IMM GRANULOCYTES # BLD: 0.01 10*3/MM3 (ref 0–0.03)
IMM GRANULOCYTES NFR BLD: 0.2 % (ref 0–0.5)
LDL SERPL QN: 21.9 NM
LDL SERPL-SCNC: 1786 NMOL/L
LDL SMALL SERPL-SCNC: 368 NMOL/L
LDLC SERPL CALC-MCNC: 154 MG/DL (ref 0–99)
LYMPHOCYTES # BLD AUTO: 1.19 10*3/MM3 (ref 0.9–4.8)
LYMPHOCYTES NFR BLD AUTO: 20.4 % (ref 19.6–45.3)
MCH RBC QN AUTO: 30.4 PG (ref 26.9–32)
MCHC RBC AUTO-ENTMCNC: 32.5 G/DL (ref 32.4–36.3)
MCV RBC AUTO: 93.6 FL (ref 80.5–98.2)
MONOCYTES # BLD AUTO: 0.39 10*3/MM3 (ref 0.2–1.2)
MONOCYTES NFR BLD AUTO: 6.7 % (ref 5–12)
NEUTROPHILS # BLD AUTO: 4.08 10*3/MM3 (ref 1.9–8.1)
NEUTROPHILS NFR BLD AUTO: 70.2 % (ref 42.7–76)
PLATELET # BLD AUTO: 254 10*3/MM3 (ref 140–500)
POTASSIUM SERPL-SCNC: 4.1 MMOL/L (ref 3.5–5.2)
PROT SERPL-MCNC: 7.3 G/DL (ref 6–8.5)
RBC # BLD AUTO: 4.08 10*6/MM3 (ref 3.9–5.2)
SODIUM SERPL-SCNC: 144 MMOL/L (ref 136–145)
TRIGL SERPL-MCNC: 115 MG/DL (ref 0–149)
TSH SERPL DL<=0.005 MIU/L-ACNC: 1.98 MIU/ML (ref 0.27–4.2)
WBC # BLD AUTO: 5.82 10*3/MM3 (ref 4.5–10.7)

## 2018-05-30 ENCOUNTER — OFFICE VISIT (OUTPATIENT)
Dept: FAMILY MEDICINE CLINIC | Facility: CLINIC | Age: 78
End: 2018-05-30

## 2018-05-30 VITALS
SYSTOLIC BLOOD PRESSURE: 120 MMHG | TEMPERATURE: 98.3 F | WEIGHT: 147.6 LBS | OXYGEN SATURATION: 97 % | BODY MASS INDEX: 28.98 KG/M2 | HEART RATE: 62 BPM | DIASTOLIC BLOOD PRESSURE: 76 MMHG | HEIGHT: 60 IN

## 2018-05-30 DIAGNOSIS — R73.9 HYPERGLYCEMIA: ICD-10-CM

## 2018-05-30 DIAGNOSIS — I10 ESSENTIAL HYPERTENSION: ICD-10-CM

## 2018-05-30 DIAGNOSIS — R01.1 SYSTOLIC MURMUR: Primary | ICD-10-CM

## 2018-05-30 DIAGNOSIS — E78.5 HYPERLIPIDEMIA, UNSPECIFIED HYPERLIPIDEMIA TYPE: ICD-10-CM

## 2018-05-30 PROCEDURE — 99214 OFFICE O/P EST MOD 30 MIN: CPT | Performed by: INTERNAL MEDICINE

## 2018-05-30 RX ORDER — SULINDAC 200 MG/1
200 TABLET ORAL
COMMUNITY
Start: 2018-04-09

## 2018-05-30 RX ORDER — ROSUVASTATIN CALCIUM 10 MG/1
10 TABLET, COATED ORAL DAILY
Qty: 90 TABLET | Refills: 3 | Status: SHIPPED | OUTPATIENT
Start: 2018-05-30 | End: 2019-07-25 | Stop reason: SDUPTHER

## 2018-05-30 RX ORDER — AMOXICILLIN AND CLAVULANATE POTASSIUM 500; 125 MG/1; MG/1
TABLET, FILM COATED ORAL
COMMUNITY
Start: 2018-05-25 | End: 2018-05-30

## 2018-05-30 NOTE — PROGRESS NOTES
Gerson Colon is a 78 y.o. female. Patient is here today for follow-up on her hypertension, hyperlipidemia, systolic heart murmur and hyperglycemia.  She did have a recent bout of the shingles and is recovered well without any residual pain.  Otherwise she's had no chest pain, shortness of breath, edema or myalgias   Chief Complaint   Patient presents with   • Hyperlipidemia     HTN, HYPERGLYCEMIA- FOLLOW UP LABS          Vitals:    05/30/18 0803   BP: 120/76   Pulse: 62   Temp: 98.3 °F (36.8 °C)   SpO2: 97%     The following portions of the patient's history were reviewed and updated as appropriate: allergies, current medications, past family history, past medical history, past social history, past surgical history and problem list.    Past Medical History:   Diagnosis Date   • Hyperlipidemia    • Hypertension       No Known Allergies   Social History     Social History   • Marital status:      Spouse name: N/A   • Number of children: N/A   • Years of education: N/A     Occupational History   • Not on file.     Social History Main Topics   • Smoking status: Former Smoker   • Smokeless tobacco: Never Used   • Alcohol use No   • Drug use: Unknown   • Sexual activity: Defer     Other Topics Concern   • Not on file     Social History Narrative   • No narrative on file        Current Outpatient Prescriptions:   •  methotrexate 2.5 MG tablet, Take by mouth., Disp: , Rfl:   •  rosuvastatin (CRESTOR) 10 MG tablet, Take 1 tablet by mouth Daily., Disp: 90 tablet, Rfl: 3  •  sulindac (CLINORIL) 200 MG tablet, , Disp: , Rfl:      Objective     History of Present Illness     Review of Systems   Constitutional: Negative.    HENT: Negative.    Eyes: Negative.    Respiratory: Negative.    Cardiovascular: Negative.    Gastrointestinal: Negative.    Endocrine: Negative.    Genitourinary: Negative.    Musculoskeletal: Negative.    Skin: Negative.    Neurological: Negative.    Psychiatric/Behavioral: Negative.         Physical Exam   Constitutional: She is oriented to person, place, and time. She appears well-developed and well-nourished.   Pleasant, cooperative no distress blood pressure 130/80   HENT:   Head: Normocephalic and atraumatic.   Eyes: Conjunctivae are normal. Pupils are equal, round, and reactive to light. No scleral icterus.   Neck: Normal range of motion. Neck supple.   Cardiovascular: Normal rate, regular rhythm and normal heart sounds.    Pulmonary/Chest: Effort normal and breath sounds normal. No respiratory distress. She has no wheezes. She has no rales.   Musculoskeletal: Normal range of motion. She exhibits no edema.   Neurological: She is alert and oriented to person, place, and time.   Skin: Skin is warm and dry.   Psychiatric: She has a normal mood and affect. Her behavior is normal.   Nursing note and vitals reviewed.      ASSESSMENT  CBC, CMP, hemoglobin A1c and TSH were all essentially normal.  Her lipid panel was much higher than usual since she has been missing Crestor and was not well controlled  #1-hypertension, reasonably controlled  #2-hyperlipidemia, not well controlled due to patient noncompliance with medicine  #3-hyperglycemia with normal Sugar and hemoglobin A1c today     Problem List Items Addressed This Visit        Cardiovascular and Mediastinum    Hyperlipidemia    Relevant Medications    rosuvastatin (CRESTOR) 10 MG tablet    Hypertension    Systolic murmur - Primary       Other    Hyperglycemia          PLAN  I recommend the shingles and hepatitis A immunizations and admonished the patient to take her Crestor regularly.  Want to recheck her in 3 months with a CMP, NMR lipid panel    There are no Patient Instructions on file for this visit.  Return in about 3 months (around 8/30/2018) for with labs.

## 2018-08-30 DIAGNOSIS — E78.5 HYPERLIPIDEMIA, UNSPECIFIED HYPERLIPIDEMIA TYPE: Primary | ICD-10-CM

## 2018-09-09 LAB
ALBUMIN SERPL-MCNC: 4.6 G/DL (ref 3.5–5.2)
ALBUMIN/GLOB SERPL: 2.2 G/DL
ALP SERPL-CCNC: 103 U/L (ref 39–117)
ALT SERPL-CCNC: 18 U/L (ref 1–33)
AST SERPL-CCNC: 18 U/L (ref 1–32)
BILIRUB SERPL-MCNC: 0.5 MG/DL (ref 0.1–1.2)
BUN SERPL-MCNC: 13 MG/DL (ref 8–23)
BUN/CREAT SERPL: 18.6 (ref 7–25)
CALCIUM SERPL-MCNC: 9.3 MG/DL (ref 8.6–10.5)
CHLORIDE SERPL-SCNC: 105 MMOL/L (ref 98–107)
CHOLEST SERPL-MCNC: 169 MG/DL (ref 100–199)
CO2 SERPL-SCNC: 23.7 MMOL/L (ref 22–29)
CREAT SERPL-MCNC: 0.7 MG/DL (ref 0.57–1)
GLOBULIN SER CALC-MCNC: 2.1 GM/DL
GLUCOSE SERPL-MCNC: 89 MG/DL (ref 65–99)
HDL SERPL-SCNC: 46.6 UMOL/L
HDLC SERPL-MCNC: 74 MG/DL
LDL SERPL QN: 21.1 NM
LDL SERPL-SCNC: 1197 NMOL/L
LDL SMALL SERPL-SCNC: 605 NMOL/L
LDLC SERPL CALC-MCNC: 76 MG/DL (ref 0–99)
POTASSIUM SERPL-SCNC: 4.3 MMOL/L (ref 3.5–5.2)
PROT SERPL-MCNC: 6.7 G/DL (ref 6–8.5)
SODIUM SERPL-SCNC: 142 MMOL/L (ref 136–145)
TRIGL SERPL-MCNC: 94 MG/DL (ref 0–149)

## 2018-09-21 ENCOUNTER — OFFICE VISIT (OUTPATIENT)
Dept: FAMILY MEDICINE CLINIC | Facility: CLINIC | Age: 78
End: 2018-09-21

## 2018-09-21 VITALS
TEMPERATURE: 98.5 F | BODY MASS INDEX: 29.06 KG/M2 | OXYGEN SATURATION: 96 % | HEART RATE: 69 BPM | HEIGHT: 60 IN | WEIGHT: 148 LBS | DIASTOLIC BLOOD PRESSURE: 82 MMHG | SYSTOLIC BLOOD PRESSURE: 128 MMHG

## 2018-09-21 DIAGNOSIS — R73.9 HYPERGLYCEMIA: ICD-10-CM

## 2018-09-21 DIAGNOSIS — I10 ESSENTIAL HYPERTENSION: Primary | ICD-10-CM

## 2018-09-21 DIAGNOSIS — R01.1 SYSTOLIC MURMUR: ICD-10-CM

## 2018-09-21 DIAGNOSIS — E78.5 HYPERLIPIDEMIA, UNSPECIFIED HYPERLIPIDEMIA TYPE: ICD-10-CM

## 2018-09-21 PROCEDURE — 90662 IIV NO PRSV INCREASED AG IM: CPT | Performed by: INTERNAL MEDICINE

## 2018-09-21 PROCEDURE — G0008 ADMIN INFLUENZA VIRUS VAC: HCPCS | Performed by: INTERNAL MEDICINE

## 2018-09-21 PROCEDURE — 99213 OFFICE O/P EST LOW 20 MIN: CPT | Performed by: INTERNAL MEDICINE

## 2018-09-21 NOTE — PROGRESS NOTES
Gerson Colon is a 78 y.o. female. Patient is here today for follow-up on her hypertension, hyperlipidemia and hyperglycemia.  She's feeling okay and is having no acute symptoms and she's been tolerating the Crestor although she admits to missing it on occasion.  She has always been very inconsistent on taking her medicine   Chief Complaint   Patient presents with   • Follow-up          Vitals:    09/21/18 1414   BP: 128/82   Pulse: 69   Temp: 98.5 °F (36.9 °C)   SpO2: 96%     The following portions of the patient's history were reviewed and updated as appropriate: allergies, current medications, past family history, past medical history, past social history, past surgical history and problem list.    Past Medical History:   Diagnosis Date   • Hyperlipidemia    • Hypertension       No Known Allergies   Social History     Social History   • Marital status:      Spouse name: N/A   • Number of children: N/A   • Years of education: N/A     Occupational History   • Not on file.     Social History Main Topics   • Smoking status: Former Smoker   • Smokeless tobacco: Never Used   • Alcohol use No   • Drug use: Unknown   • Sexual activity: Defer     Other Topics Concern   • Not on file     Social History Narrative   • No narrative on file        Current Outpatient Prescriptions:   •  methotrexate 2.5 MG tablet, Take 2.5 mg by mouth., Disp: , Rfl:   •  rosuvastatin (CRESTOR) 10 MG tablet, Take 1 tablet by mouth Daily., Disp: 90 tablet, Rfl: 3  •  sulindac (CLINORIL) 200 MG tablet, Take 200 mg by mouth., Disp: , Rfl:      Objective     History of Present Illness     Review of Systems   Constitutional: Negative.    HENT: Negative.    Eyes: Negative.    Respiratory: Negative.    Cardiovascular: Negative.    Gastrointestinal: Negative.    Genitourinary: Negative.    Musculoskeletal: Negative.    Skin: Negative.    Neurological: Negative.    Psychiatric/Behavioral: Negative.        Physical Exam   Constitutional:  She is oriented to person, place, and time. She appears well-developed and well-nourished.   HENT:   Head: Normocephalic and atraumatic.   Eyes: Pupils are equal, round, and reactive to light. Conjunctivae are normal. No scleral icterus.   Neck: Normal range of motion. Neck supple.   Cardiovascular: Normal rate, regular rhythm and normal heart sounds.    Pulmonary/Chest: Effort normal and breath sounds normal. No respiratory distress. She has no wheezes. She has no rales.   Musculoskeletal: Normal range of motion. She exhibits no edema.   Neurological: She is alert and oriented to person, place, and time.   Skin: Skin is warm and dry.   Psychiatric: She has a normal mood and affect. Her behavior is normal.   Nursing note and vitals reviewed.      ASSESSMENT  CMP was completely normal.  Cholesterol is much improved with a total cholesterol of 169, HDL of 74 and LDL of 76 with the particle number in the moderate range now.  #1-hypertension, adequately controlled  #2-hyperlipidemia, good control on Crestor 10 mg       Problem List Items Addressed This Visit        Cardiovascular and Mediastinum    Hyperlipidemia    Hypertension - Primary    Systolic murmur       Other    Hyperglycemia          PLAN  the patient received a flu shot today.  the patient will continue current medicines as now.  I plan on rechecking her in 4 months with a CBC, CMP, lipid panel    There are no Patient Instructions on file for this visit.  Return in about 4 months (around 1/21/2019) for with labs.

## 2019-01-11 DIAGNOSIS — I10 ESSENTIAL HYPERTENSION: ICD-10-CM

## 2019-01-11 DIAGNOSIS — E78.5 HYPERLIPIDEMIA, UNSPECIFIED HYPERLIPIDEMIA TYPE: Primary | ICD-10-CM

## 2019-01-14 LAB
ALBUMIN SERPL-MCNC: 4.2 G/DL (ref 3.5–5.2)
ALBUMIN/GLOB SERPL: 1.4 G/DL
ALP SERPL-CCNC: 110 U/L (ref 39–117)
ALT SERPL-CCNC: 13 U/L (ref 1–33)
AST SERPL-CCNC: 15 U/L (ref 1–32)
BASOPHILS # BLD AUTO: 0.02 10*3/MM3 (ref 0–0.2)
BASOPHILS NFR BLD AUTO: 0.3 % (ref 0–1.5)
BILIRUB SERPL-MCNC: 0.5 MG/DL (ref 0.1–1.2)
BUN SERPL-MCNC: 13 MG/DL (ref 8–23)
BUN/CREAT SERPL: 19.7 (ref 7–25)
CALCIUM SERPL-MCNC: 9.7 MG/DL (ref 8.6–10.5)
CHLORIDE SERPL-SCNC: 104 MMOL/L (ref 98–107)
CHOLEST SERPL-MCNC: 193 MG/DL (ref 0–200)
CO2 SERPL-SCNC: 23.5 MMOL/L (ref 22–29)
CREAT SERPL-MCNC: 0.66 MG/DL (ref 0.57–1)
EOSINOPHIL # BLD AUTO: 0.21 10*3/MM3 (ref 0–0.7)
EOSINOPHIL NFR BLD AUTO: 3.5 % (ref 0.3–6.2)
ERYTHROCYTE [DISTWIDTH] IN BLOOD BY AUTOMATED COUNT: 14.8 % (ref 11.7–13)
GLOBULIN SER CALC-MCNC: 3 GM/DL
GLUCOSE SERPL-MCNC: 91 MG/DL (ref 65–99)
HCT VFR BLD AUTO: 38.1 % (ref 35.6–45.5)
HDLC SERPL-MCNC: 81 MG/DL (ref 40–60)
HGB BLD-MCNC: 12.1 G/DL (ref 11.9–15.5)
IMM GRANULOCYTES # BLD AUTO: 0.01 10*3/MM3 (ref 0–0.03)
IMM GRANULOCYTES NFR BLD AUTO: 0.2 % (ref 0–0.5)
LDLC SERPL CALC-MCNC: 97 MG/DL (ref 0–100)
LDLC/HDLC SERPL: 1.2 {RATIO}
LYMPHOCYTES # BLD AUTO: 1.18 10*3/MM3 (ref 0.9–4.8)
LYMPHOCYTES NFR BLD AUTO: 19.4 % (ref 19.6–45.3)
MCH RBC QN AUTO: 30.6 PG (ref 26.9–32)
MCHC RBC AUTO-ENTMCNC: 31.8 G/DL (ref 32.4–36.3)
MCV RBC AUTO: 96.5 FL (ref 80.5–98.2)
MONOCYTES # BLD AUTO: 0.48 10*3/MM3 (ref 0.2–1.2)
MONOCYTES NFR BLD AUTO: 7.9 % (ref 5–12)
NEUTROPHILS # BLD AUTO: 4.18 10*3/MM3 (ref 1.9–8.1)
NEUTROPHILS NFR BLD AUTO: 68.9 % (ref 42.7–76)
PLATELET # BLD AUTO: 264 10*3/MM3 (ref 140–500)
POTASSIUM SERPL-SCNC: 3.9 MMOL/L (ref 3.5–5.2)
PROT SERPL-MCNC: 7.2 G/DL (ref 6–8.5)
RBC # BLD AUTO: 3.95 10*6/MM3 (ref 3.9–5.2)
SODIUM SERPL-SCNC: 140 MMOL/L (ref 136–145)
TRIGL SERPL-MCNC: 74 MG/DL (ref 0–150)
VLDLC SERPL CALC-MCNC: 14.8 MG/DL (ref 5–40)
WBC # BLD AUTO: 6.07 10*3/MM3 (ref 4.5–10.7)

## 2019-01-23 ENCOUNTER — OFFICE VISIT (OUTPATIENT)
Dept: FAMILY MEDICINE CLINIC | Facility: CLINIC | Age: 79
End: 2019-01-23

## 2019-01-23 VITALS
HEART RATE: 74 BPM | RESPIRATION RATE: 15 BRPM | BODY MASS INDEX: 29.06 KG/M2 | HEIGHT: 60 IN | DIASTOLIC BLOOD PRESSURE: 80 MMHG | OXYGEN SATURATION: 96 % | SYSTOLIC BLOOD PRESSURE: 120 MMHG | TEMPERATURE: 97.1 F | WEIGHT: 148 LBS

## 2019-01-23 DIAGNOSIS — E78.5 HYPERLIPIDEMIA, UNSPECIFIED HYPERLIPIDEMIA TYPE: ICD-10-CM

## 2019-01-23 DIAGNOSIS — R01.1 SYSTOLIC MURMUR: Primary | ICD-10-CM

## 2019-01-23 DIAGNOSIS — R73.9 HYPERGLYCEMIA: ICD-10-CM

## 2019-01-23 DIAGNOSIS — I10 ESSENTIAL HYPERTENSION: ICD-10-CM

## 2019-01-23 PROBLEM — B02.9 HERPES ZOSTER WITHOUT COMPLICATION: Status: RESOLVED | Noted: 2018-02-14 | Resolved: 2019-01-23

## 2019-01-23 PROCEDURE — 99214 OFFICE O/P EST MOD 30 MIN: CPT | Performed by: INTERNAL MEDICINE

## 2019-01-23 NOTE — PROGRESS NOTES
Gerson Colon is a 78 y.o. female. Patient is here today for follow-up on her hypertension, hyperlipidemia, hyperglycemia and systolic heart murmur.  He is generally stable and has no new acute complaints other than some arthritis.  She's not taking her Crestor on a regular basis unfortunately  Chief Complaint   Patient presents with   • Hypertension   • Hyperlipidemia          Vitals:    01/23/19 1024   BP: 120/80   Pulse: 74   Resp: 15   Temp: 97.1 °F (36.2 °C)   SpO2: 96%     The following portions of the patient's history were reviewed and updated as appropriate: allergies, current medications, past family history, past medical history, past social history, past surgical history and problem list.    Past Medical History:   Diagnosis Date   • Hyperlipidemia    • Hypertension       No Known Allergies   Social History     Socioeconomic History   • Marital status:      Spouse name: Not on file   • Number of children: Not on file   • Years of education: Not on file   • Highest education level: Not on file   Social Needs   • Financial resource strain: Not on file   • Food insecurity - worry: Not on file   • Food insecurity - inability: Not on file   • Transportation needs - medical: Not on file   • Transportation needs - non-medical: Not on file   Occupational History   • Not on file   Tobacco Use   • Smoking status: Former Smoker   • Smokeless tobacco: Never Used   Substance and Sexual Activity   • Alcohol use: No   • Drug use: Defer   • Sexual activity: Defer   Other Topics Concern   • Not on file   Social History Narrative   • Not on file        Current Outpatient Medications:   •  methotrexate 2.5 MG tablet, Take 2.5 mg by mouth., Disp: , Rfl:   •  rosuvastatin (CRESTOR) 10 MG tablet, Take 1 tablet by mouth Daily., Disp: 90 tablet, Rfl: 3  •  sulindac (CLINORIL) 200 MG tablet, Take 200 mg by mouth., Disp: , Rfl:      Objective     History of Present Illness     Review of Systems   Constitutional:  Negative.    HENT: Negative.    Eyes: Negative.    Respiratory: Negative.    Cardiovascular: Negative.    Gastrointestinal: Negative.    Genitourinary: Negative.    Musculoskeletal: Negative.    Skin: Negative.    Neurological: Negative.    Psychiatric/Behavioral: Negative.        Physical Exam   Constitutional: She is oriented to person, place, and time. She appears well-developed and well-nourished.   Pleasant, cooperative no distress   HENT:   Head: Normocephalic and atraumatic.   Eyes: Conjunctivae are normal. Pupils are equal, round, and reactive to light. No scleral icterus.   Neck: Normal range of motion. Neck supple. No thyromegaly present.   Cardiovascular: Normal rate and regular rhythm.   Murmur heard.  2 to 3/6 systolic murmur at the upper sternal border   Pulmonary/Chest: Effort normal and breath sounds normal. No respiratory distress. She has no wheezes. She has no rales.   Musculoskeletal: Normal range of motion. She exhibits no edema.   Neurological: She is alert and oriented to person, place, and time.   Skin: Skin is warm and dry.   Psychiatric: She has a normal mood and affect. Her behavior is normal.   Nursing note and vitals reviewed.      ASSESSMENT  CBC is normal.  CMP was also completely normal.  Lipid panel is a bit higher but acceptable with total cholesterol 193, HDL 81 and LDL of 97.  #1-hypertension, reasonable control  #2-hyperlipidemia, reasonable values on Crestor  #3-history of hyperglycemia was normal Sugar today  #4-systolic heart murmur, probably some aortic stenosis or calcified valve, asymptomatic     Problem List Items Addressed This Visit        Cardiovascular and Mediastinum    Hyperlipidemia    Hypertension    Systolic murmur - Primary       Other    Hyperglycemia          PLAN  the patient will continue current medicines as now and I'll recheck her in 6 months with a CMP, lipid panel, hemoglobin A1c and TSH    There are no Patient Instructions on file for this  visit.  Return in about 6 months (around 7/23/2019) for with labs.

## 2019-07-15 DIAGNOSIS — R73.9 HYPERGLYCEMIA: ICD-10-CM

## 2019-07-15 DIAGNOSIS — I10 ESSENTIAL HYPERTENSION: ICD-10-CM

## 2019-07-15 DIAGNOSIS — E78.5 HYPERLIPIDEMIA, UNSPECIFIED HYPERLIPIDEMIA TYPE: Primary | ICD-10-CM

## 2019-07-19 LAB
ALBUMIN SERPL-MCNC: 4.2 G/DL (ref 3.5–5.2)
ALBUMIN/GLOB SERPL: 1.6 G/DL
ALP SERPL-CCNC: 114 U/L (ref 39–117)
ALT SERPL-CCNC: 13 U/L (ref 1–33)
AST SERPL-CCNC: 16 U/L (ref 1–32)
BILIRUB SERPL-MCNC: 0.5 MG/DL (ref 0.2–1.2)
BUN SERPL-MCNC: 12 MG/DL (ref 8–23)
BUN/CREAT SERPL: 16.9 (ref 7–25)
CALCIUM SERPL-MCNC: 9.2 MG/DL (ref 8.6–10.5)
CHLORIDE SERPL-SCNC: 104 MMOL/L (ref 98–107)
CHOLEST SERPL-MCNC: 196 MG/DL (ref 0–200)
CO2 SERPL-SCNC: 23.8 MMOL/L (ref 22–29)
CREAT SERPL-MCNC: 0.71 MG/DL (ref 0.57–1)
GLOBULIN SER CALC-MCNC: 2.6 GM/DL
GLUCOSE SERPL-MCNC: 96 MG/DL (ref 65–99)
HBA1C MFR BLD: 5.64 % (ref 4.8–5.6)
HDLC SERPL-MCNC: 78 MG/DL (ref 40–60)
LDLC SERPL CALC-MCNC: 99 MG/DL (ref 0–100)
LDLC/HDLC SERPL: 1.27 {RATIO}
POTASSIUM SERPL-SCNC: 4.2 MMOL/L (ref 3.5–5.2)
PROT SERPL-MCNC: 6.8 G/DL (ref 6–8.5)
SODIUM SERPL-SCNC: 141 MMOL/L (ref 136–145)
TRIGL SERPL-MCNC: 93 MG/DL (ref 0–150)
TSH SERPL DL<=0.005 MIU/L-ACNC: 2.29 MIU/ML (ref 0.27–4.2)
VLDLC SERPL CALC-MCNC: 18.6 MG/DL

## 2019-07-25 ENCOUNTER — OFFICE VISIT (OUTPATIENT)
Dept: FAMILY MEDICINE CLINIC | Facility: CLINIC | Age: 79
End: 2019-07-25

## 2019-07-25 VITALS
DIASTOLIC BLOOD PRESSURE: 74 MMHG | BODY MASS INDEX: 29.33 KG/M2 | HEIGHT: 60 IN | SYSTOLIC BLOOD PRESSURE: 130 MMHG | RESPIRATION RATE: 16 BRPM | TEMPERATURE: 98.4 F | WEIGHT: 149.4 LBS | HEART RATE: 64 BPM | OXYGEN SATURATION: 97 %

## 2019-07-25 DIAGNOSIS — R73.9 HYPERGLYCEMIA: ICD-10-CM

## 2019-07-25 DIAGNOSIS — E78.5 HYPERLIPIDEMIA, UNSPECIFIED HYPERLIPIDEMIA TYPE: ICD-10-CM

## 2019-07-25 DIAGNOSIS — I10 ESSENTIAL HYPERTENSION: Primary | ICD-10-CM

## 2019-07-25 PROCEDURE — 99214 OFFICE O/P EST MOD 30 MIN: CPT | Performed by: INTERNAL MEDICINE

## 2019-07-25 RX ORDER — ROSUVASTATIN CALCIUM 10 MG/1
10 TABLET, COATED ORAL DAILY
Qty: 90 TABLET | Refills: 3 | Status: SHIPPED | OUTPATIENT
Start: 2019-07-25 | End: 2020-07-24 | Stop reason: SDUPTHER

## 2019-07-25 NOTE — PROGRESS NOTES
Gerson Colon is a 79 y.o. female. Patient is here today for follow-up on her hypertension, hyperlipidemia, history of osteopenia and hyperglycemia.  Patient's generally been stable and has no new acute complaints aside from some intermittent stuffiness in her left ear.  She denies any chest pains, shortness of breath, edema or myalgias.  Chief Complaint   Patient presents with   • Hyperlipidemia     HTN,  HYPERGLYCEMIA- FOLLOW UP LABS          Vitals:    07/25/19 1038   BP: 130/74   Pulse: 64   Resp: 16   Temp: 98.4 °F (36.9 °C)   SpO2: 97%     The following portions of the patient's history were reviewed and updated as appropriate: allergies, current medications, past family history, past medical history, past social history, past surgical history and problem list.    Past Medical History:   Diagnosis Date   • Hyperlipidemia    • Hypertension       No Known Allergies   Social History     Socioeconomic History   • Marital status:      Spouse name: Not on file   • Number of children: Not on file   • Years of education: Not on file   • Highest education level: Not on file   Tobacco Use   • Smoking status: Former Smoker   • Smokeless tobacco: Never Used   Substance and Sexual Activity   • Alcohol use: No   • Drug use: Defer   • Sexual activity: Defer        Current Outpatient Medications:   •  methotrexate 2.5 MG tablet, Take 2.5 mg by mouth., Disp: , Rfl:   •  rosuvastatin (CRESTOR) 10 MG tablet, Take 1 tablet by mouth Daily., Disp: 90 tablet, Rfl: 3  •  sulindac (CLINORIL) 200 MG tablet, Take 200 mg by mouth., Disp: , Rfl:      Objective     History of Present Illness     Review of Systems   Constitutional: Negative.    HENT: Negative.    Eyes: Negative.    Respiratory: Negative.    Cardiovascular: Negative.    Gastrointestinal: Negative.    Genitourinary: Negative.    Musculoskeletal: Negative.    Skin: Negative.    Neurological: Negative.    Psychiatric/Behavioral: Negative.        Physical Exam    Constitutional: She is oriented to person, place, and time. She appears well-developed and well-nourished.   Pleasant, cooperative no distress, blood pressure 130/80   HENT:   Head: Normocephalic and atraumatic.   Right Ear: Tympanic membrane, external ear and ear canal normal.   Left Ear: Tympanic membrane, external ear and ear canal normal.   Eyes: Conjunctivae are normal. Pupils are equal, round, and reactive to light. No scleral icterus.   Neck: Normal range of motion. Neck supple.   Cardiovascular: Normal rate, regular rhythm and normal heart sounds.   Pulmonary/Chest: Effort normal and breath sounds normal. No respiratory distress. She has no wheezes. She has no rales.   Musculoskeletal: Normal range of motion. She exhibits no edema.   Neurological: She is alert and oriented to person, place, and time.   Skin: Skin is warm and dry.   Psychiatric: She has a normal mood and affect. Her behavior is normal.   Nursing note and vitals reviewed.      ASSESSMENT CMP was completely normal with a sugar of 96 and hemoglobin A1c was 5.64.  TSH was quite normal.  Lipid panel is controlled with total cholesterol 196, HDL of 78 and LDL of 99.  #1-hypertension, controlled  #2-hyperlipidemia, controlled on medication  #3-history of hyperglycemia with normal sugar and minimally elevated but stable hemoglobin A1c  #4-history of osteopenia.     Problem List Items Addressed This Visit        Cardiovascular and Mediastinum    Hyperlipidemia    Relevant Medications    rosuvastatin (CRESTOR) 10 MG tablet    Hypertension - Primary       Other    Hyperglycemia          PLAN the patient refuses Medicare wellness check, bone density and mammograms.  She will continue current medicines as now.  I have recommended the hepatitis A and shingles vaccinations.  I recommended she start a calcium vitamin D supplement such as Caltrate or Citracal.  I will recheck the patient in 6 months with a CBC, CMP, lipid panel, hemoglobin A1c and vitamin D  level because of her osteopenia    There are no Patient Instructions on file for this visit.  Return in about 6 months (around 1/25/2020) for with labs.

## 2020-01-15 DIAGNOSIS — R73.9 HYPERGLYCEMIA: ICD-10-CM

## 2020-01-15 DIAGNOSIS — M85.80 OSTEOPENIA, UNSPECIFIED LOCATION: ICD-10-CM

## 2020-01-15 DIAGNOSIS — E78.5 HYPERLIPIDEMIA, UNSPECIFIED HYPERLIPIDEMIA TYPE: ICD-10-CM

## 2020-01-21 LAB
25(OH)D3+25(OH)D2 SERPL-MCNC: 18.1 NG/ML (ref 30–100)
ALBUMIN SERPL-MCNC: 4.6 G/DL (ref 3.5–5.2)
ALBUMIN/GLOB SERPL: 2.1 G/DL
ALP SERPL-CCNC: 102 U/L (ref 39–117)
ALT SERPL-CCNC: 14 U/L (ref 1–33)
AST SERPL-CCNC: 18 U/L (ref 1–32)
BASOPHILS # BLD AUTO: 0.03 10*3/MM3 (ref 0–0.2)
BASOPHILS NFR BLD AUTO: 0.4 % (ref 0–1.5)
BILIRUB SERPL-MCNC: 0.6 MG/DL (ref 0.2–1.2)
BUN SERPL-MCNC: 13 MG/DL (ref 8–23)
BUN/CREAT SERPL: 19.1 (ref 7–25)
CALCIUM SERPL-MCNC: 9.5 MG/DL (ref 8.6–10.5)
CHLORIDE SERPL-SCNC: 102 MMOL/L (ref 98–107)
CHOLEST SERPL-MCNC: 201 MG/DL (ref 0–200)
CO2 SERPL-SCNC: 22.7 MMOL/L (ref 22–29)
CREAT SERPL-MCNC: 0.68 MG/DL (ref 0.57–1)
EOSINOPHIL # BLD AUTO: 0.14 10*3/MM3 (ref 0–0.4)
EOSINOPHIL NFR BLD AUTO: 2.1 % (ref 0.3–6.2)
ERYTHROCYTE [DISTWIDTH] IN BLOOD BY AUTOMATED COUNT: 13.7 % (ref 12.3–15.4)
GLOBULIN SER CALC-MCNC: 2.2 GM/DL
GLUCOSE SERPL-MCNC: 85 MG/DL (ref 65–99)
HBA1C MFR BLD: 5.6 % (ref 4.8–5.6)
HCT VFR BLD AUTO: 38 % (ref 34–46.6)
HDLC SERPL-MCNC: 79 MG/DL (ref 40–60)
HGB BLD-MCNC: 12.9 G/DL (ref 12–15.9)
IMM GRANULOCYTES # BLD AUTO: 0.01 10*3/MM3 (ref 0–0.05)
IMM GRANULOCYTES NFR BLD AUTO: 0.1 % (ref 0–0.5)
LDLC SERPL CALC-MCNC: 102 MG/DL (ref 0–100)
LDLC/HDLC SERPL: 1.29 {RATIO}
LYMPHOCYTES # BLD AUTO: 1.45 10*3/MM3 (ref 0.7–3.1)
LYMPHOCYTES NFR BLD AUTO: 21.5 % (ref 19.6–45.3)
MCH RBC QN AUTO: 31.5 PG (ref 26.6–33)
MCHC RBC AUTO-ENTMCNC: 33.9 G/DL (ref 31.5–35.7)
MCV RBC AUTO: 92.9 FL (ref 79–97)
MONOCYTES # BLD AUTO: 0.4 10*3/MM3 (ref 0.1–0.9)
MONOCYTES NFR BLD AUTO: 5.9 % (ref 5–12)
NEUTROPHILS # BLD AUTO: 4.71 10*3/MM3 (ref 1.7–7)
NEUTROPHILS NFR BLD AUTO: 70 % (ref 42.7–76)
NRBC BLD AUTO-RTO: 0 /100 WBC (ref 0–0.2)
PLATELET # BLD AUTO: 238 10*3/MM3 (ref 140–450)
POTASSIUM SERPL-SCNC: 4.1 MMOL/L (ref 3.5–5.2)
PROT SERPL-MCNC: 6.8 G/DL (ref 6–8.5)
RBC # BLD AUTO: 4.09 10*6/MM3 (ref 3.77–5.28)
SODIUM SERPL-SCNC: 140 MMOL/L (ref 136–145)
TRIGL SERPL-MCNC: 100 MG/DL (ref 0–150)
VLDLC SERPL CALC-MCNC: 20 MG/DL
WBC # BLD AUTO: 6.74 10*3/MM3 (ref 3.4–10.8)

## 2020-01-24 ENCOUNTER — OFFICE VISIT (OUTPATIENT)
Dept: FAMILY MEDICINE CLINIC | Facility: CLINIC | Age: 80
End: 2020-01-24

## 2020-01-24 VITALS
HEIGHT: 60 IN | TEMPERATURE: 98 F | BODY MASS INDEX: 28.27 KG/M2 | OXYGEN SATURATION: 98 % | RESPIRATION RATE: 16 BRPM | SYSTOLIC BLOOD PRESSURE: 122 MMHG | DIASTOLIC BLOOD PRESSURE: 70 MMHG | WEIGHT: 144 LBS | HEART RATE: 67 BPM

## 2020-01-24 DIAGNOSIS — R73.9 HYPERGLYCEMIA: ICD-10-CM

## 2020-01-24 DIAGNOSIS — R01.1 SYSTOLIC MURMUR: Primary | ICD-10-CM

## 2020-01-24 DIAGNOSIS — E55.9 VITAMIN D DEFICIENCY DISEASE: ICD-10-CM

## 2020-01-24 DIAGNOSIS — I10 ESSENTIAL HYPERTENSION: ICD-10-CM

## 2020-01-24 DIAGNOSIS — M85.80 OSTEOPENIA, UNSPECIFIED LOCATION: ICD-10-CM

## 2020-01-24 DIAGNOSIS — M19.90 ARTHRITIS: ICD-10-CM

## 2020-01-24 DIAGNOSIS — E78.5 HYPERLIPIDEMIA, UNSPECIFIED HYPERLIPIDEMIA TYPE: ICD-10-CM

## 2020-01-24 PROCEDURE — 99214 OFFICE O/P EST MOD 30 MIN: CPT | Performed by: INTERNAL MEDICINE

## 2020-01-24 NOTE — PROGRESS NOTES
Gerson Colon is a 79 y.o. female. Patient is here today for follow-up on her hypertension, hyperlipidemia, hyperglycemia and systolic heart murmur.  Patient is generally been stable.  She does see Dr. Null for arthritis and tells me she has a torn meniscus and has been going to physical therapy.  There are no records in her chart on this.  Otherwise she has had no chest pain, shortness of breath, edema or myalgias  Chief Complaint   Patient presents with   • Hypertension     HLD, HYPERGLYCEMIA- FOLLOW UP LABS          Vitals:    01/24/20 1039   BP: 122/70   Pulse: 67   Resp: 16   Temp: 98 °F (36.7 °C)   SpO2: 98%     Body mass index is 28.12 kg/m².  The following portions of the patient's history were reviewed and updated as appropriate: allergies, current medications, past family history, past medical history, past social history, past surgical history and problem list.    Past Medical History:   Diagnosis Date   • Hyperlipidemia    • Hypertension       No Known Allergies   Social History     Socioeconomic History   • Marital status:      Spouse name: Not on file   • Number of children: Not on file   • Years of education: Not on file   • Highest education level: Not on file   Tobacco Use   • Smoking status: Former Smoker   • Smokeless tobacco: Never Used   Substance and Sexual Activity   • Alcohol use: No   • Drug use: Defer   • Sexual activity: Defer        Current Outpatient Medications:   •  calcium citrate-vitamin d (CITRACAL) 200-250 MG-UNIT tablet tablet, Take 2 tablets by mouth Daily., Disp: , Rfl:   •  methotrexate 2.5 MG tablet, Take 2.5 mg by mouth., Disp: , Rfl:   •  rosuvastatin (CRESTOR) 10 MG tablet, Take 1 tablet by mouth Daily., Disp: 90 tablet, Rfl: 3  •  sulindac (CLINORIL) 200 MG tablet, Take 200 mg by mouth., Disp: , Rfl:      Objective     History of Present Illness     Review of Systems   Constitutional: Negative.    HENT: Negative.    Eyes: Negative.    Respiratory:  Negative.    Cardiovascular: Negative.    Gastrointestinal: Negative.    Genitourinary: Negative.    Musculoskeletal: Negative.    Skin: Negative.    Neurological: Negative.    Psychiatric/Behavioral: Negative.        Physical Exam   Constitutional: She is oriented to person, place, and time. She appears well-developed and well-nourished.   Pleasant, cooperative no acute distress, blood pressure 120/80   HENT:   Head: Normocephalic and atraumatic.   Right Ear: Decreased hearing is noted.   Left Ear: Decreased hearing is noted.   Eyes: Pupils are equal, round, and reactive to light. Conjunctivae are normal. No scleral icterus.   Neck: Normal range of motion. Neck supple.   Cardiovascular: Normal rate and regular rhythm.   Murmur heard.  2/6 systolic murmur at the upper sternal border   Pulmonary/Chest: Effort normal and breath sounds normal. No respiratory distress. She has no wheezes. She has no rales.   Musculoskeletal: Normal range of motion.   Walking with a slight limp   Neurological: She is alert and oriented to person, place, and time.   Skin: Skin is warm and dry.   Psychiatric: She has a normal mood and affect. Her behavior is normal.   Nursing note and vitals reviewed.      ASSESSMENT CBC was completely normal.  CMP was also completely normal.  Lipid panel is stable with a total cholesterol of 201, good HDL of 79 and .  Hemoglobin A1c was normal.  Vitamin D level was quite low at 18.1.  #1-systolic heart murmur, stable and asymptomatic  #2-hypertension, good blood pressure today  #3-hyperlipidemia, reasonable control on medication  #4-history of hyperglycemia with normal sugar and hemoglobin A1c, diet controlled  #5-vitamin D deficiency  #6-history of arthritis, seeing rheumatologist     Problem List Items Addressed This Visit        Cardiovascular and Mediastinum    Hyperlipidemia    Hypertension    Systolic murmur - Primary       Digestive    Vitamin D deficiency disease       Musculoskeletal and  Integument    Osteopenia    Arthritis       Other    Hyperglycemia          PLAN the patient will continue current medicines as now.  I recommended she start a supplement such as Citracal or Caltrate for her vitamin D.  I would like to recheck her in 6 months with a CMP, lipid panel, hemoglobin A1c and vitamin D level    There are no Patient Instructions on file for this visit.  Return in about 6 months (around 7/24/2020) for with labs.

## 2020-07-14 DIAGNOSIS — R73.9 HYPERGLYCEMIA: ICD-10-CM

## 2020-07-14 DIAGNOSIS — E78.5 HYPERLIPIDEMIA, UNSPECIFIED HYPERLIPIDEMIA TYPE: ICD-10-CM

## 2020-07-14 DIAGNOSIS — E55.9 VITAMIN D DEFICIENCY DISEASE: ICD-10-CM

## 2020-07-17 ENCOUNTER — RESULTS ENCOUNTER (OUTPATIENT)
Dept: FAMILY MEDICINE CLINIC | Facility: CLINIC | Age: 80
End: 2020-07-17

## 2020-07-17 DIAGNOSIS — E78.5 HYPERLIPIDEMIA, UNSPECIFIED HYPERLIPIDEMIA TYPE: ICD-10-CM

## 2020-07-17 DIAGNOSIS — R73.9 HYPERGLYCEMIA: ICD-10-CM

## 2020-07-17 DIAGNOSIS — E55.9 VITAMIN D DEFICIENCY DISEASE: ICD-10-CM

## 2020-07-20 LAB
25(OH)D3+25(OH)D2 SERPL-MCNC: 21.8 NG/ML (ref 30–100)
ALBUMIN SERPL-MCNC: 4.6 G/DL (ref 3.5–5.2)
ALBUMIN/GLOB SERPL: 2 G/DL
ALP SERPL-CCNC: 97 U/L (ref 39–117)
ALT SERPL-CCNC: 15 U/L (ref 1–33)
AST SERPL-CCNC: 14 U/L (ref 1–32)
BILIRUB SERPL-MCNC: 0.6 MG/DL (ref 0–1.2)
BUN SERPL-MCNC: 17 MG/DL (ref 8–23)
BUN/CREAT SERPL: 27 (ref 7–25)
CALCIUM SERPL-MCNC: 9.4 MG/DL (ref 8.6–10.5)
CHLORIDE SERPL-SCNC: 105 MMOL/L (ref 98–107)
CHOLEST SERPL-MCNC: 244 MG/DL (ref 0–200)
CO2 SERPL-SCNC: 22.7 MMOL/L (ref 22–29)
CREAT SERPL-MCNC: 0.63 MG/DL (ref 0.57–1)
GLOBULIN SER CALC-MCNC: 2.3 GM/DL
GLUCOSE SERPL-MCNC: 93 MG/DL (ref 65–99)
HBA1C MFR BLD: 5.3 % (ref 4.8–5.6)
HDLC SERPL-MCNC: 78 MG/DL (ref 40–60)
LDLC SERPL CALC-MCNC: 144 MG/DL (ref 0–100)
LDLC/HDLC SERPL: 1.85 {RATIO}
POTASSIUM SERPL-SCNC: 4.2 MMOL/L (ref 3.5–5.2)
PROT SERPL-MCNC: 6.9 G/DL (ref 6–8.5)
SODIUM SERPL-SCNC: 140 MMOL/L (ref 136–145)
TRIGL SERPL-MCNC: 108 MG/DL (ref 0–150)
VLDLC SERPL CALC-MCNC: 21.6 MG/DL

## 2020-07-24 ENCOUNTER — OFFICE VISIT (OUTPATIENT)
Dept: FAMILY MEDICINE CLINIC | Facility: CLINIC | Age: 80
End: 2020-07-24

## 2020-07-24 VITALS
SYSTOLIC BLOOD PRESSURE: 120 MMHG | HEIGHT: 60 IN | TEMPERATURE: 96.9 F | OXYGEN SATURATION: 98 % | HEART RATE: 69 BPM | BODY MASS INDEX: 29.72 KG/M2 | WEIGHT: 151.4 LBS | RESPIRATION RATE: 18 BRPM | DIASTOLIC BLOOD PRESSURE: 70 MMHG

## 2020-07-24 DIAGNOSIS — R73.9 HYPERGLYCEMIA: ICD-10-CM

## 2020-07-24 DIAGNOSIS — R01.1 SYSTOLIC MURMUR: Primary | ICD-10-CM

## 2020-07-24 DIAGNOSIS — I10 ESSENTIAL HYPERTENSION: ICD-10-CM

## 2020-07-24 DIAGNOSIS — E55.9 VITAMIN D DEFICIENCY DISEASE: ICD-10-CM

## 2020-07-24 DIAGNOSIS — E78.5 HYPERLIPIDEMIA, UNSPECIFIED HYPERLIPIDEMIA TYPE: ICD-10-CM

## 2020-07-24 DIAGNOSIS — M85.80 OSTEOPENIA, UNSPECIFIED LOCATION: ICD-10-CM

## 2020-07-24 PROCEDURE — 99214 OFFICE O/P EST MOD 30 MIN: CPT | Performed by: INTERNAL MEDICINE

## 2020-07-24 RX ORDER — ROSUVASTATIN CALCIUM 10 MG/1
10 TABLET, COATED ORAL DAILY
Qty: 90 TABLET | Refills: 3 | Status: SHIPPED | OUTPATIENT
Start: 2020-07-24 | End: 2021-08-06

## 2020-07-24 NOTE — PROGRESS NOTES
Gerson Colon is a 80 y.o. female. Patient is here today for follow-up on her systolic heart murmur, hyperlipidemia, hypertension, vitamin D deficiency, osteopenia and hyperglycemia.  Patient's generally been stable.  She is only been taking 1 calcium a day and has not been taking Crestor regularly.  She generally feels well and denies any chest pain, shortness of breath, edema or myalgias  Chief Complaint   Patient presents with   • Hyperlipidemia          Vitals:    07/24/20 1258   BP: 120/70   Pulse: 69   Resp: 18   Temp: 96.9 °F (36.1 °C)   SpO2: 98%     Body mass index is 29.57 kg/m².  The following portions of the patient's history were reviewed and updated as appropriate: allergies, current medications, past family history, past medical history, past social history, past surgical history and problem list.    Past Medical History:   Diagnosis Date   • Hyperlipidemia    • Hypertension       No Known Allergies   Social History     Socioeconomic History   • Marital status:      Spouse name: Not on file   • Number of children: Not on file   • Years of education: Not on file   • Highest education level: Not on file   Tobacco Use   • Smoking status: Former Smoker   • Smokeless tobacco: Never Used   Substance and Sexual Activity   • Alcohol use: No   • Drug use: Defer   • Sexual activity: Defer        Current Outpatient Medications:   •  calcium citrate-vitamin d (CITRACAL) 200-250 MG-UNIT tablet tablet, Take 2 tablets by mouth Daily., Disp: , Rfl:   •  methotrexate 2.5 MG tablet, Take 2.5 mg by mouth., Disp: , Rfl:   •  rosuvastatin (CRESTOR) 10 MG tablet, Take 1 tablet by mouth Daily., Disp: 90 tablet, Rfl: 3  •  sulindac (CLINORIL) 200 MG tablet, Take 200 mg by mouth., Disp: , Rfl:      Objective     History of Present Illness     Review of Systems   Constitutional: Negative.    HENT: Negative.    Respiratory: Negative.    Cardiovascular: Negative.    Gastrointestinal: Negative.    Genitourinary:  Negative.    Musculoskeletal: Negative.    Skin: Negative.    Neurological: Negative.    Psychiatric/Behavioral: Negative.        Physical Exam   Constitutional: She is oriented to person, place, and time. She appears well-developed and well-nourished.   Pleasant, cooperative no acute distress, blood pressure 130/70   HENT:   Head: Normocephalic and atraumatic.   Eyes: Conjunctivae are normal. No scleral icterus.   Neck: Normal range of motion. Neck supple.   Cardiovascular: Normal rate and regular rhythm.   Murmur heard.  1/6 to 2/6 systolic murmur at the upper sternal border   Pulmonary/Chest: Effort normal and breath sounds normal. No respiratory distress. She has no wheezes. She has no rales.   Musculoskeletal: Normal range of motion. She exhibits no edema.   Neurological: She is alert and oriented to person, place, and time.   Skin: Skin is warm and dry.   Psychiatric: She has a normal mood and affect. Her behavior is normal.   Nursing note and vitals reviewed.      ASSESSMENT CMP was normal and hemoglobin A1c was normal as well.  Vitamin D level remains low at 21.8 and lipid panel was high with total cholesterol 244, HDL 78 and .  #1-hyperlipidemia, patient resistant to taking her Crestor regularly  #2-history of hyperglycemia with normal sugar and hemoglobin A1c today  #3-vitamin D deficiency, the patient only taking 1 calcium tablet daily  #4-history of osteopenia  #5-systolic heart murmur can assistant with some aortic valve calcification, asymptomatic     Problem List Items Addressed This Visit        Cardiovascular and Mediastinum    Hyperlipidemia    Relevant Medications    rosuvastatin (CRESTOR) 10 MG tablet    Hypertension    Systolic murmur - Primary       Digestive    Vitamin D deficiency disease       Musculoskeletal and Integument    Osteopenia       Other    Hyperglycemia          PLAN the patient is hardheaded and declines a bone density test and mammograms.  She says she will take the  Crestor but I doubt she will regularly.  I have advised her to increase the Citracal or Caltrate 2 tablets daily.  I recommended a flu shot in October and I plan on rechecking the patient in 6 months with a CBC, CMP, lipid panel, vitamin D level, TSH and free T4    There are no Patient Instructions on file for this visit.  Return in about 6 months (around 1/24/2021) for with labs.

## 2020-10-01 ENCOUNTER — FLU SHOT (OUTPATIENT)
Dept: FAMILY MEDICINE CLINIC | Facility: CLINIC | Age: 80
End: 2020-10-01

## 2020-10-01 DIAGNOSIS — Z23 NEED FOR INFLUENZA VACCINATION: ICD-10-CM

## 2020-10-01 PROCEDURE — 90694 VACC AIIV4 NO PRSRV 0.5ML IM: CPT | Performed by: INTERNAL MEDICINE

## 2020-10-01 PROCEDURE — G0008 ADMIN INFLUENZA VIRUS VAC: HCPCS | Performed by: INTERNAL MEDICINE

## 2021-01-25 DIAGNOSIS — I10 ESSENTIAL HYPERTENSION: ICD-10-CM

## 2021-01-25 DIAGNOSIS — E55.9 VITAMIN D DEFICIENCY DISEASE: ICD-10-CM

## 2021-01-25 DIAGNOSIS — E78.5 HYPERLIPIDEMIA, UNSPECIFIED HYPERLIPIDEMIA TYPE: Primary | ICD-10-CM

## 2021-01-27 LAB
25(OH)D3+25(OH)D2 SERPL-MCNC: 25.7 NG/ML (ref 30–100)
ALBUMIN SERPL-MCNC: 4.3 G/DL (ref 3.5–5.2)
ALBUMIN/GLOB SERPL: 1.6 G/DL
ALP SERPL-CCNC: 107 U/L (ref 39–117)
ALT SERPL-CCNC: 14 U/L (ref 1–33)
AST SERPL-CCNC: 18 U/L (ref 1–32)
BASOPHILS # BLD AUTO: 0.03 10*3/MM3 (ref 0–0.2)
BASOPHILS NFR BLD AUTO: 0.5 % (ref 0–1.5)
BILIRUB SERPL-MCNC: 0.6 MG/DL (ref 0–1.2)
BUN SERPL-MCNC: 12 MG/DL (ref 8–23)
BUN/CREAT SERPL: 21.1 (ref 7–25)
CALCIUM SERPL-MCNC: 9.5 MG/DL (ref 8.6–10.5)
CHLORIDE SERPL-SCNC: 104 MMOL/L (ref 98–107)
CHOLEST SERPL-MCNC: 165 MG/DL (ref 0–200)
CO2 SERPL-SCNC: 23.6 MMOL/L (ref 22–29)
CREAT SERPL-MCNC: 0.57 MG/DL (ref 0.57–1)
EOSINOPHIL # BLD AUTO: 0.15 10*3/MM3 (ref 0–0.4)
EOSINOPHIL NFR BLD AUTO: 2.4 % (ref 0.3–6.2)
ERYTHROCYTE [DISTWIDTH] IN BLOOD BY AUTOMATED COUNT: 13.9 % (ref 12.3–15.4)
GLOBULIN SER CALC-MCNC: 2.7 GM/DL
GLUCOSE SERPL-MCNC: 90 MG/DL (ref 65–99)
HCT VFR BLD AUTO: 37.4 % (ref 34–46.6)
HDLC SERPL-MCNC: 80 MG/DL (ref 40–60)
HGB BLD-MCNC: 12.6 G/DL (ref 12–15.9)
IMM GRANULOCYTES # BLD AUTO: 0.02 10*3/MM3 (ref 0–0.05)
IMM GRANULOCYTES NFR BLD AUTO: 0.3 % (ref 0–0.5)
LDLC SERPL CALC-MCNC: 68 MG/DL (ref 0–100)
LDLC/HDLC SERPL: 0.83 {RATIO}
LYMPHOCYTES # BLD AUTO: 1.27 10*3/MM3 (ref 0.7–3.1)
LYMPHOCYTES NFR BLD AUTO: 20.6 % (ref 19.6–45.3)
MCH RBC QN AUTO: 30.7 PG (ref 26.6–33)
MCHC RBC AUTO-ENTMCNC: 33.7 G/DL (ref 31.5–35.7)
MCV RBC AUTO: 91.2 FL (ref 79–97)
MONOCYTES # BLD AUTO: 0.35 10*3/MM3 (ref 0.1–0.9)
MONOCYTES NFR BLD AUTO: 5.7 % (ref 5–12)
NEUTROPHILS # BLD AUTO: 4.35 10*3/MM3 (ref 1.7–7)
NEUTROPHILS NFR BLD AUTO: 70.5 % (ref 42.7–76)
NRBC BLD AUTO-RTO: 0 /100 WBC (ref 0–0.2)
PLATELET # BLD AUTO: 235 10*3/MM3 (ref 140–450)
POTASSIUM SERPL-SCNC: 4.1 MMOL/L (ref 3.5–5.2)
PROT SERPL-MCNC: 7 G/DL (ref 6–8.5)
RBC # BLD AUTO: 4.1 10*6/MM3 (ref 3.77–5.28)
SODIUM SERPL-SCNC: 140 MMOL/L (ref 136–145)
T4 FREE SERPL-MCNC: 0.96 NG/DL (ref 0.93–1.7)
TRIGL SERPL-MCNC: 94 MG/DL (ref 0–150)
TSH SERPL DL<=0.005 MIU/L-ACNC: 1.86 UIU/ML (ref 0.27–4.2)
VLDLC SERPL CALC-MCNC: 17 MG/DL (ref 5–40)
WBC # BLD AUTO: 6.17 10*3/MM3 (ref 3.4–10.8)

## 2021-02-02 ENCOUNTER — OFFICE VISIT (OUTPATIENT)
Dept: FAMILY MEDICINE CLINIC | Facility: CLINIC | Age: 81
End: 2021-02-02

## 2021-02-02 VITALS
OXYGEN SATURATION: 100 % | HEART RATE: 72 BPM | DIASTOLIC BLOOD PRESSURE: 74 MMHG | RESPIRATION RATE: 17 BRPM | WEIGHT: 153.8 LBS | HEIGHT: 60 IN | BODY MASS INDEX: 30.19 KG/M2 | TEMPERATURE: 96.6 F | SYSTOLIC BLOOD PRESSURE: 151 MMHG

## 2021-02-02 DIAGNOSIS — R73.9 HYPERGLYCEMIA: ICD-10-CM

## 2021-02-02 DIAGNOSIS — E55.9 VITAMIN D DEFICIENCY DISEASE: ICD-10-CM

## 2021-02-02 DIAGNOSIS — R01.1 SYSTOLIC MURMUR: Primary | ICD-10-CM

## 2021-02-02 DIAGNOSIS — E78.5 HYPERLIPIDEMIA, UNSPECIFIED HYPERLIPIDEMIA TYPE: ICD-10-CM

## 2021-02-02 DIAGNOSIS — I10 ESSENTIAL HYPERTENSION: ICD-10-CM

## 2021-02-02 PROCEDURE — 99214 OFFICE O/P EST MOD 30 MIN: CPT | Performed by: INTERNAL MEDICINE

## 2021-02-02 NOTE — PROGRESS NOTES
Gerson Colon is a 80 y.o. female. Patient is here today for follow-up on her hyperlipidemia, hypertension, hyperglycemia, vitamin D deficiency and systolic heart murmur. Patient's generally been stable and has no acute complaints. She is scheduled for COVID-19 vaccination next week she believes  Chief Complaint   Patient presents with   • Hyperlipidemia     lab f/u   • Hypertension   • Vitamin D Deficiency          Vitals:    02/02/21 1321   BP: 151/74   Pulse: 72   Resp: 17   Temp: 96.6 °F (35.9 °C)   SpO2: 100%     Body mass index is 30.04 kg/m².  The following portions of the patient's history were reviewed and updated as appropriate: allergies, current medications, past family history, past medical history, past social history, past surgical history and problem list.    Past Medical History:   Diagnosis Date   • Hyperlipidemia    • Hypertension       No Known Allergies   Social History     Socioeconomic History   • Marital status:      Spouse name: Not on file   • Number of children: Not on file   • Years of education: Not on file   • Highest education level: Not on file   Tobacco Use   • Smoking status: Former Smoker   • Smokeless tobacco: Never Used   Substance and Sexual Activity   • Alcohol use: No   • Drug use: Defer   • Sexual activity: Defer        Current Outpatient Medications:   •  calcium citrate-vitamin d (CITRACAL) 200-250 MG-UNIT tablet tablet, Take 2 tablets by mouth Daily., Disp: , Rfl:   •  methotrexate 2.5 MG tablet, Take 2.5 mg by mouth., Disp: , Rfl:   •  rosuvastatin (CRESTOR) 10 MG tablet, Take 1 tablet by mouth Daily., Disp: 90 tablet, Rfl: 3  •  sulindac (CLINORIL) 200 MG tablet, Take 200 mg by mouth., Disp: , Rfl:      Objective     History of Present Illness     Review of Systems   Constitutional: Negative.    HENT: Negative.    Respiratory: Negative.    Cardiovascular: Negative.    Gastrointestinal: Negative.    Endocrine: Negative.    Genitourinary: Negative.     Musculoskeletal: Negative.    Skin: Negative.    Neurological: Negative.    Hematological: Negative.    Psychiatric/Behavioral: Negative.        Physical Exam  Vitals signs and nursing note reviewed.   Constitutional:       General: She is not in acute distress.     Appearance: Normal appearance. She is not ill-appearing.   HENT:      Head: Normocephalic and atraumatic.   Eyes:      General: No scleral icterus.     Conjunctiva/sclera: Conjunctivae normal.   Neck:      Musculoskeletal: Normal range of motion and neck supple.   Cardiovascular:      Rate and Rhythm: Normal rate and regular rhythm.      Heart sounds: Murmur present.   Pulmonary:      Effort: Pulmonary effort is normal. No respiratory distress.      Breath sounds: Normal breath sounds. No wheezing or rales.   Musculoskeletal: Normal range of motion.   Skin:     General: Skin is warm and dry.   Neurological:      General: No focal deficit present.      Mental Status: She is alert and oriented to person, place, and time.   Psychiatric:         Mood and Affect: Mood normal.         Behavior: Behavior normal.         ASSESSMENT CBC is normal. CMP was completely normal and lipid panel is much improved with total cholesterol 165, HDL 80 and LDL 68. Vitamin D level is improved but still a bit low at 25 and TSH and free T4 are both normal.  #1-hypertension controlled  #2-hyperlipidemia, controlled on medication  #3-systolic heart murmur at the upper sternal border, probably some aortic valve calcification  #4-hyperglycemia with normal sugar today  #5-vitamin D deficiency, mild and somewhat corrected with supplement     Problems Addressed this Visit        Cardiac and Vasculature    Hyperlipidemia    Hypertension    Systolic murmur - Primary       Endocrine and Metabolic    Hyperglycemia    Vitamin D deficiency disease      Diagnoses       Codes Comments    Systolic murmur    -  Primary ICD-10-CM: R01.1  ICD-9-CM: 785.2     Essential hypertension     ICD-10-CM:  I10  ICD-9-CM: 401.9     Hyperlipidemia, unspecified hyperlipidemia type     ICD-10-CM: E78.5  ICD-9-CM: 272.4     Hyperglycemia     ICD-10-CM: R73.9  ICD-9-CM: 790.29     Vitamin D deficiency disease     ICD-10-CM: E55.9  ICD-9-CM: 268.9           PLAN the patient will continue current medicines as now and I would like to recheck her in 6 months with a CMP, lipid panel, hemoglobin A1c, vitamin D level    There are no Patient Instructions on file for this visit.  Return in about 6 months (around 8/2/2021) for with labs.

## 2021-07-27 ENCOUNTER — OFFICE VISIT (OUTPATIENT)
Dept: FAMILY MEDICINE CLINIC | Facility: CLINIC | Age: 81
End: 2021-07-27

## 2021-07-27 VITALS
OXYGEN SATURATION: 98 % | HEART RATE: 75 BPM | TEMPERATURE: 97.8 F | HEIGHT: 60 IN | BODY MASS INDEX: 28.86 KG/M2 | SYSTOLIC BLOOD PRESSURE: 128 MMHG | DIASTOLIC BLOOD PRESSURE: 82 MMHG | RESPIRATION RATE: 18 BRPM | WEIGHT: 147 LBS

## 2021-07-27 DIAGNOSIS — H91.92 HEARING DIFFICULTY OF LEFT EAR: ICD-10-CM

## 2021-07-27 DIAGNOSIS — H65.03 BILATERAL ACUTE SEROUS OTITIS MEDIA, RECURRENCE NOT SPECIFIED: Primary | ICD-10-CM

## 2021-07-27 PROCEDURE — 99213 OFFICE O/P EST LOW 20 MIN: CPT | Performed by: NURSE PRACTITIONER

## 2021-07-27 RX ORDER — AZELASTINE 1 MG/ML
1 SPRAY, METERED NASAL 2 TIMES DAILY
Qty: 30 ML | Refills: 0 | Status: SHIPPED | OUTPATIENT
Start: 2021-07-27 | End: 2021-08-10

## 2021-07-27 RX ORDER — FLUTICASONE PROPIONATE 50 MCG
1 SPRAY, SUSPENSION (ML) NASAL 2 TIMES DAILY
Qty: 16 G | Refills: 0 | Status: SHIPPED | OUTPATIENT
Start: 2021-07-27 | End: 2021-08-10

## 2021-07-27 NOTE — PROGRESS NOTES
"Gerson Colon is a 81 y.o.. female.     Earache   There is pain in the left ear. This is a new problem. Episode onset: \"weeks\" The problem has been unchanged. There has been no fever. Associated symptoms include hearing loss (left ear with worsening hearing issues). Pertinent negatives include no coughing, diarrhea, headaches, rhinorrhea, sore throat or vomiting. She has tried nothing for the symptoms.       The following portions of the patient's history were reviewed and updated as appropriate: allergies, current medications, past family history, past medical history, past social history, past surgical history and problem list.    Past Medical History:   Diagnosis Date   • Hyperlipidemia    • Hypertension        Past Surgical History:   Procedure Laterality Date   • NO PAST SURGERIES         Review of Systems   Constitutional: Negative for fever.   HENT: Positive for congestion and hearing loss (left ear with worsening hearing issues). Negative for ear pain, postnasal drip, rhinorrhea and sore throat.    Respiratory: Negative for cough and shortness of breath.    Cardiovascular: Negative for chest pain.   Gastrointestinal: Negative for diarrhea, nausea and vomiting.   Neurological: Negative for dizziness and headaches.       No Known Allergies    Objective     Vitals:    07/27/21 1525   BP: 128/82   Pulse: 75   Resp: 18   Temp: 97.8 °F (36.6 °C)   SpO2: 98%   Weight: 66.7 kg (147 lb)   Height: 152.4 cm (60\")     Body mass index is 28.71 kg/m².    Physical Exam  Vitals reviewed.   HENT:      Head: Normocephalic.      Right Ear: A middle ear effusion (clear fluid) is present. Tympanic membrane is not erythematous, retracted or bulging.      Left Ear: A middle ear effusion (clear fluid) is present. Tympanic membrane is not erythematous, retracted or bulging.      Nose: Rhinorrhea present. No congestion.      Mouth/Throat:      Mouth: Mucous membranes are moist.      Pharynx: Oropharyngeal exudate (clear " pnd) present. No pharyngeal swelling or posterior oropharyngeal erythema.   Eyes:      Pupils: Pupils are equal, round, and reactive to light.   Cardiovascular:      Rate and Rhythm: Normal rate and regular rhythm.   Pulmonary:      Effort: Pulmonary effort is normal.      Breath sounds: Normal breath sounds.   Lymphadenopathy:      Cervical: No cervical adenopathy.   Neurological:      Mental Status: She is alert and oriented to person, place, and time.   Psychiatric:         Behavior: Behavior normal.           Current Outpatient Medications:   •  calcium citrate-vitamin d (CITRACAL) 200-250 MG-UNIT tablet tablet, Take 2 tablets by mouth Daily., Disp: , Rfl:   •  methotrexate 2.5 MG tablet, Take 2.5 mg by mouth. Takes 8 a week, Disp: , Rfl:   •  rosuvastatin (CRESTOR) 10 MG tablet, Take 1 tablet by mouth Daily., Disp: 90 tablet, Rfl: 3  •  sulindac (CLINORIL) 200 MG tablet, Take 200 mg by mouth., Disp: , Rfl:   •  azelastine (ASTELIN) 0.1 % nasal spray, 1 spray into the nostril(s) as directed by provider 2 (Two) Times a Day for 14 days. Use in each nostril as directed, Disp: 30 mL, Rfl: 0  •  fluticasone (FLONASE) 50 MCG/ACT nasal spray, 1 spray into the nostril(s) as directed by provider 2 (two) times a day for 14 days., Disp: 16 g, Rfl: 0        Assessment/Plan   Diagnoses and all orders for this visit:    1. Bilateral acute serous otitis media, recurrence not specified (Primary)  -     fluticasone (FLONASE) 50 MCG/ACT nasal spray; 1 spray into the nostril(s) as directed by provider 2 (two) times a day for 14 days.  Dispense: 16 g; Refill: 0  -     azelastine (ASTELIN) 0.1 % nasal spray; 1 spray into the nostril(s) as directed by provider 2 (Two) Times a Day for 14 days. Use in each nostril as directed  Dispense: 30 mL; Refill: 0    2. Hearing difficulty of left ear        Patient Instructions   Drink plenty of fluids, rest, eat a heart healthy diet  If no improvement after completion of medications, recommend  following up with audiologist.       Return if symptoms worsen or fail to improve.

## 2021-07-27 NOTE — PATIENT INSTRUCTIONS
Drink plenty of fluids, rest, eat a heart healthy diet  If no improvement after completion of medications, recommend following up with audiologist.

## 2021-07-28 DIAGNOSIS — R73.9 HYPERGLYCEMIA: ICD-10-CM

## 2021-07-28 DIAGNOSIS — E55.9 VITAMIN D DEFICIENCY DISEASE: ICD-10-CM

## 2021-07-28 DIAGNOSIS — E78.5 HYPERLIPIDEMIA, UNSPECIFIED HYPERLIPIDEMIA TYPE: ICD-10-CM

## 2021-08-06 RX ORDER — ROSUVASTATIN CALCIUM 10 MG/1
TABLET, COATED ORAL
Qty: 90 TABLET | Refills: 3 | Status: SHIPPED | OUTPATIENT
Start: 2021-08-06 | End: 2022-10-14

## 2021-08-10 ENCOUNTER — OFFICE VISIT (OUTPATIENT)
Dept: FAMILY MEDICINE CLINIC | Facility: CLINIC | Age: 81
End: 2021-08-10

## 2021-08-10 VITALS
HEART RATE: 62 BPM | HEIGHT: 64 IN | WEIGHT: 148.6 LBS | SYSTOLIC BLOOD PRESSURE: 116 MMHG | BODY MASS INDEX: 25.37 KG/M2 | DIASTOLIC BLOOD PRESSURE: 72 MMHG | OXYGEN SATURATION: 97 % | TEMPERATURE: 97.1 F

## 2021-08-10 DIAGNOSIS — I10 ESSENTIAL HYPERTENSION: ICD-10-CM

## 2021-08-10 DIAGNOSIS — R01.1 SYSTOLIC MURMUR: Primary | ICD-10-CM

## 2021-08-10 DIAGNOSIS — R73.9 HYPERGLYCEMIA: ICD-10-CM

## 2021-08-10 DIAGNOSIS — E55.9 VITAMIN D DEFICIENCY DISEASE: ICD-10-CM

## 2021-08-10 DIAGNOSIS — E78.5 HYPERLIPIDEMIA, UNSPECIFIED HYPERLIPIDEMIA TYPE: ICD-10-CM

## 2021-08-10 DIAGNOSIS — M85.80 OSTEOPENIA, UNSPECIFIED LOCATION: ICD-10-CM

## 2021-08-10 PROCEDURE — 99214 OFFICE O/P EST MOD 30 MIN: CPT | Performed by: INTERNAL MEDICINE

## 2021-08-10 PROCEDURE — G0439 PPPS, SUBSEQ VISIT: HCPCS | Performed by: INTERNAL MEDICINE

## 2021-08-10 NOTE — PROGRESS NOTES
The ABCs of the Annual Wellness Visit  Subsequent Medicare Wellness Visit    Chief Complaint   Patient presents with   • Medicare Wellness-subsequent     DISCUSS LKAB RESULTS   • Rheumatoid Arthritis     KNEE PAIN       Subjective   History of Present Illness:  Monica Colon is a 81 y.o. female who presents for a Subsequent Medicare Wellness Visit.  She is also here for follow-up on her history of systolic heart murmur, hypertension, hyperlipidemia, hyperglycemia and vitamin D deficiency.  She is generally feeling well and has no acute complaints    HEALTH RISK ASSESSMENT    Recent Hospitalizations:  No hospitalization(s) within the last year.    Current Medical Providers:  Patient Care Team:  Evan Borja MD as PCP - General    Smoking Status:  Social History     Tobacco Use   Smoking Status Former Smoker   Smokeless Tobacco Never Used       Alcohol Consumption:  Social History     Substance and Sexual Activity   Alcohol Use No       Depression Screen:   PHQ-2/PHQ-9 Depression Screening 8/10/2021   Little interest or pleasure in doing things 0   Feeling down, depressed, or hopeless 1   Trouble falling or staying asleep, or sleeping too much -   Feeling tired or having little energy -   Poor appetite or overeating -   Feeling bad about yourself - or that you are a failure or have let yourself or your family down -   Trouble concentrating on things, such as reading the newspaper or watching television -   Moving or speaking so slowly that other people could have noticed. Or the opposite - being so fidgety or restless that you have been moving around a lot more than usual -   Thoughts that you would be better off dead, or of hurting yourself in some way -   Total Score 1   If you checked off any problems, how difficult have these problems made it for you to do your work, take care of things at home, or get along with other people? -       Fall Risk Screen:  STEADI Fall Risk Assessment was completed, and patient  is at MODERATE risk for falls. Assessment completed on:8/10/2021    Health Habits and Functional and Cognitive Screening:  Functional & Cognitive Status 8/10/2021   Do you have difficulty preparing food and eating? No   Do you have difficulty bathing yourself, getting dressed or grooming yourself? No   Do you have difficulty using the toilet? No   Do you have difficulty moving around from place to place? Yes   Do you have trouble with steps or getting out of a bed or a chair? Yes   Current Diet Well Balanced Diet   Dental Exam Not up to date   Eye Exam Not up to date   Exercise (times per week) 0 times per week   Current Exercises Include No Regular Exercise   Current Exercise Activities Include -   Do you need help using the phone?  No   Are you deaf or do you have serious difficulty hearing?  No   Do you need help with transportation? No   Do you need help shopping? No   Do you need help preparing meals?  No   Do you need help with housework?  No   Do you need help with laundry? No   Do you need help taking your medications? No   Do you need help managing money? No   Do you ever drive or ride in a car without wearing a seat belt? No   Have you felt unusual stress, anger or loneliness in the last month? -   Who do you live with? -   If you need help, do you have trouble finding someone available to you? -   Have you been bothered in the last four weeks by sexual problems? -   Do you have difficulty concentrating, remembering or making decisions? -         Does the patient have evidence of cognitive impairment? No    Asprin use counseling:Does not need ASA (and currently is not on it)    Age-appropriate Screening Schedule:  Refer to the list below for future screening recommendations based on patient's age, sex and/or medical conditions. Orders for these recommended tests are listed in the plan section. The patient has been provided with a written plan.    Health Maintenance   Topic Date Due   • ZOSTER VACCINE (2 of  3) 11/08/2012   • MAMMOGRAM  03/28/2019   • DXA SCAN  03/28/2019   • INFLUENZA VACCINE  10/01/2021   • LIPID PANEL  08/02/2022   • TDAP/TD VACCINES (2 - Td or Tdap) 09/08/2025          The following portions of the patient's history were reviewed and updated as appropriate: allergies, current medications, past family history, past medical history, past social history, past surgical history and problem list.    Outpatient Medications Prior to Visit   Medication Sig Dispense Refill   • azelastine (ASTELIN) 0.1 % nasal spray 1 spray into the nostril(s) as directed by provider 2 (Two) Times a Day for 14 days. Use in each nostril as directed 30 mL 0   • calcium citrate-vitamin d (CITRACAL) 200-250 MG-UNIT tablet tablet Take 2 tablets by mouth Daily.     • fluticasone (FLONASE) 50 MCG/ACT nasal spray 1 spray into the nostril(s) as directed by provider 2 (two) times a day for 14 days. 16 g 0   • methotrexate 2.5 MG tablet Take 2.5 mg by mouth. Takes 8 a week     • rosuvastatin (CRESTOR) 10 MG tablet TAKE ONE TABLET BY MOUTH DAILY 90 tablet 3   • sulindac (CLINORIL) 200 MG tablet Take 200 mg by mouth.       No facility-administered medications prior to visit.       Patient Active Problem List   Diagnosis   • Hyperglycemia   • Hyperlipidemia   • Hypertension   • Osteopenia   • Systolic murmur   • Vitamin D deficiency disease   • Arthritis       Advanced Care Planning:  ACP discussion was held with the patient during this visit. Patient has an advance directive in EMR which is still valid.     Review of Systems   Constitutional: Negative.    HENT: Negative.    Respiratory: Negative.    Cardiovascular: Negative.    Gastrointestinal: Negative.    Genitourinary: Negative.    Musculoskeletal: Negative.    Skin: Negative.    Neurological: Negative.    Hematological: Negative.    Psychiatric/Behavioral: Negative.        Compared to one year ago, the patient feels her physical health is the same.  Compared to one year ago, the  "patient feels her mental health is the same.    Reviewed chart for potential of high risk medication in the elderly: yes  Reviewed chart for potential of harmful drug interactions in the elderly:yes    Objective         Vitals:    08/10/21 1341   BP: 116/72   Pulse: 62   Temp: 97.1 °F (36.2 °C)   SpO2: 97%   Weight: 67.4 kg (148 lb 9.6 oz)   Height: 162.6 cm (64\")       Body mass index is 25.51 kg/m².  Discussed the patient's BMI with her. The BMI is above average; no BMI management plan is appropriate..    Physical Exam  Vitals and nursing note reviewed.   Constitutional:       General: She is not in acute distress.     Appearance: Normal appearance. She is obese. She is not ill-appearing.   HENT:      Head: Normocephalic and atraumatic.   Eyes:      General: No scleral icterus.     Conjunctiva/sclera: Conjunctivae normal.   Cardiovascular:      Rate and Rhythm: Normal rate and regular rhythm.      Heart sounds: Murmur heard.        Comments: 1/6 to 2/6 systolic murmur at the upper sternal border  Pulmonary:      Effort: Pulmonary effort is normal. No respiratory distress.      Breath sounds: Normal breath sounds. No wheezing, rhonchi or rales.   Musculoskeletal:         General: Normal range of motion.      Cervical back: Normal range of motion and neck supple.   Skin:     General: Skin is warm and dry.   Neurological:      General: No focal deficit present.      Mental Status: She is alert and oriented to person, place, and time.   Psychiatric:         Mood and Affect: Mood normal.         Behavior: Behavior normal.         Lab Results   Component Value Date    GLU 92 08/02/2021    CHLPL 169 08/02/2021    TRIG 65 08/02/2021    HDL 77 (H) 08/02/2021    LDL 79 08/02/2021    VLDL 13 08/02/2021    HGBA1C 5.40 08/02/2021        Assessment/Plan CMP is normal aside from an alkaline phosphatase minimally high at 122.  Lipid panel has a total cholesterol 169, HDL 77, LDL 79.  Hemoglobin A1c is normal at 5.4 and vitamin D " level is a bit better but still low at 23.  #1-hypertension controlled  #2-hyperlipidemia controlled on medication  #3-systolic heart murmur, stable and asymptomatic  #4-history of hyperglycemia with normal sugar and hemoglobin A1c  #5-vitamin D deficiency, irregular in taking her supplement    Medicare Risks and Personalized Health Plan  CMS Preventative Services Quick Reference  Breast Cancer/Mammogram Screening  Colon Cancer Screening  Immunizations Discussed/Encouraged (specific immunizations; Influenza and Shingrix )    The above risks/problems have been discussed with the patient.  Pertinent information has been shared with the patient in the After Visit Summary.  Follow up plans and orders are seen below in the Assessment/Plan Section.    Diagnoses and all orders for this visit:    1. Systolic murmur (Primary)    2. Essential hypertension    3. Hyperlipidemia, unspecified hyperlipidemia type    4. Hyperglycemia    5. Vitamin D deficiency disease    6. Osteopenia, unspecified location      Follow Up: I told the patient to take her calcium and vitamin D regularly.  She will continue current medicines as now.  I recommended she get a flu shot in October and shingles immunizations.  Patient also should get mammograms and bone density but wants to defer those.  I will recheck her in 6 months with a CBC, CMP, lipid panel, TSH and free T4 and vitamin D level    Return in about 6 months (around 2/10/2022) for with labs.     An After Visit Summary and PPPS were given to the patient.

## 2021-08-10 NOTE — PROGRESS NOTES
Gerson Colon is a 81 y.o. female. Patient is here today for   Chief Complaint   Patient presents with   • Medicare Wellness-subsequent     DISCUSS LKAB RESULTS   • Rheumatoid Arthritis     KNEE PAIN          Vitals:    08/10/21 1341   BP: 116/72   Pulse: 62   Temp: 97.1 °F (36.2 °C)   SpO2: 97%     Body mass index is 25.51 kg/m².  The following portions of the patient's history were reviewed and updated as appropriate: allergies, current medications, past family history, past medical history, past social history, past surgical history and problem list.    Past Medical History:   Diagnosis Date   • Hyperlipidemia    • Hypertension       No Known Allergies   Social History     Socioeconomic History   • Marital status:      Spouse name: Not on file   • Number of children: Not on file   • Years of education: Not on file   • Highest education level: Not on file   Tobacco Use   • Smoking status: Former Smoker   • Smokeless tobacco: Never Used   Substance and Sexual Activity   • Alcohol use: No   • Drug use: Defer   • Sexual activity: Defer        Current Outpatient Medications:   •  azelastine (ASTELIN) 0.1 % nasal spray, 1 spray into the nostril(s) as directed by provider 2 (Two) Times a Day for 14 days. Use in each nostril as directed, Disp: 30 mL, Rfl: 0  •  calcium citrate-vitamin d (CITRACAL) 200-250 MG-UNIT tablet tablet, Take 2 tablets by mouth Daily., Disp: , Rfl:   •  fluticasone (FLONASE) 50 MCG/ACT nasal spray, 1 spray into the nostril(s) as directed by provider 2 (two) times a day for 14 days., Disp: 16 g, Rfl: 0  •  methotrexate 2.5 MG tablet, Take 2.5 mg by mouth. Takes 8 a week, Disp: , Rfl:   •  rosuvastatin (CRESTOR) 10 MG tablet, TAKE ONE TABLET BY MOUTH DAILY, Disp: 90 tablet, Rfl: 3  •  sulindac (CLINORIL) 200 MG tablet, Take 200 mg by mouth., Disp: , Rfl:      Objective     History of Present Illness     Review of Systems    Physical Exam    ASSESSMENT     Problems Addressed this  Visit     None      Diagnoses    None.         PLAN    There are no Patient Instructions on file for this visit.  No follow-ups on file.

## 2021-09-27 ENCOUNTER — TELEPHONE (OUTPATIENT)
Dept: FAMILY MEDICINE CLINIC | Facility: CLINIC | Age: 81
End: 2021-09-27

## 2021-09-27 NOTE — TELEPHONE ENCOUNTER
Caller: IsaiahMonica    Relationship: Self    Best call back number: [phone]    What orders are you requesting (i.e. lab or imaging): REFERRAL TO AN ENT    In what timeframe would the patient need to come in: AS SOON AS POSSIBLE    Where will you receive your lab/imaging services: PATIENT WOULD LIKE TO STAY WITH IN [facility] IF POSSIBLE.    Additional notes: PATIENT STATED THAT SHE HAS FLUID BEHIND THE EARS AND WOULD LIKE TO HAVE A REFERRAL TO AN ENT.

## 2021-09-28 DIAGNOSIS — H93.8X9 SENSATION OF FULLNESS IN EAR, UNSPECIFIED LATERALITY: Primary | ICD-10-CM

## 2021-12-10 ENCOUNTER — TELEPHONE (OUTPATIENT)
Dept: FAMILY MEDICINE CLINIC | Facility: CLINIC | Age: 81
End: 2021-12-10

## 2021-12-10 DIAGNOSIS — M25.579 PAIN AND SWELLING OF ANKLE, UNSPECIFIED LATERALITY: ICD-10-CM

## 2021-12-10 DIAGNOSIS — M25.473 PAIN AND SWELLING OF ANKLE, UNSPECIFIED LATERALITY: ICD-10-CM

## 2021-12-10 DIAGNOSIS — M19.90 ARTHRITIS: Primary | ICD-10-CM

## 2021-12-10 NOTE — TELEPHONE ENCOUNTER
Caller: Monica Colon    Relationship: Self    Best call back number: [phone]     What orders are you requesting (i.e. lab or imaging): PHYSICAL THERAPY     In what timeframe would the patient need to come in: ASAP    Where will you receive your lab/imaging services: [facility]    Additional notes: PATIENT STATES SHE WOULD LIKE TO HAVE PHYSICAL THERAPY AT [facility] AND WOULD LIKE TO HAVE THE ORDER FAXED TO THEM. PLEASE CALL WHEN COMPLETE.

## 2021-12-14 NOTE — TELEPHONE ENCOUNTER
HUB TO READ: CALLED AND LEFT MESSAGE FOR PT TO CALL BACK. WHICH [facility] LOCATION DOES PATIENT WANT TO GO TO SO THAT I CAN FAX ORDER? THANK YOU.

## 2021-12-15 NOTE — TELEPHONE ENCOUNTER
LEFT MESSAGE X2-   HUB TO READ: CALLED AND LEFT MESSAGE FOR PT TO CALL BACK. WHICH [facility] LOCATION DOES PATIENT WANT TO GO TO SO THAT I CAN FAX ORDER? THANK YOU.

## 2021-12-16 NOTE — TELEPHONE ENCOUNTER
LEFT MESSAGE X3- WILL COMPLETE TASK AND AWAIT PATIENT CALL BACK.   HUB TO READ: CALLED AND LEFT MESSAGE FOR PT TO CALL BACK. WHICH [facility] LOCATION DOES PATIENT WANT TO GO TO SO THAT I CAN FAX ORDER? THANK YOU.

## 2022-02-14 DIAGNOSIS — E78.5 HYPERLIPIDEMIA, UNSPECIFIED HYPERLIPIDEMIA TYPE: ICD-10-CM

## 2022-02-14 DIAGNOSIS — E55.9 VITAMIN D DEFICIENCY DISEASE: ICD-10-CM

## 2022-02-16 LAB
25(OH)D3+25(OH)D2 SERPL-MCNC: 16.9 NG/ML (ref 30–100)
ALBUMIN SERPL-MCNC: 3.9 G/DL (ref 3.6–4.6)
ALBUMIN/GLOB SERPL: 1.4 {RATIO} (ref 1.2–2.2)
ALP SERPL-CCNC: 161 IU/L (ref 44–121)
ALT SERPL-CCNC: 9 IU/L (ref 0–32)
AST SERPL-CCNC: 17 IU/L (ref 0–40)
BASOPHILS # BLD AUTO: 0 X10E3/UL (ref 0–0.2)
BASOPHILS NFR BLD AUTO: 1 %
BILIRUB SERPL-MCNC: 0.4 MG/DL (ref 0–1.2)
BUN SERPL-MCNC: 12 MG/DL (ref 8–27)
BUN/CREAT SERPL: 19 (ref 12–28)
CALCIUM SERPL-MCNC: 9.3 MG/DL (ref 8.7–10.3)
CHLORIDE SERPL-SCNC: 106 MMOL/L (ref 96–106)
CHOLEST SERPL-MCNC: 238 MG/DL (ref 100–199)
CO2 SERPL-SCNC: 19 MMOL/L (ref 20–29)
CREAT SERPL-MCNC: 0.64 MG/DL (ref 0.57–1)
EOSINOPHIL # BLD AUTO: 0.2 X10E3/UL (ref 0–0.4)
EOSINOPHIL NFR BLD AUTO: 4 %
ERYTHROCYTE [DISTWIDTH] IN BLOOD BY AUTOMATED COUNT: 13.4 % (ref 11.7–15.4)
GLOBULIN SER CALC-MCNC: 2.8 G/DL (ref 1.5–4.5)
GLUCOSE SERPL-MCNC: 82 MG/DL (ref 65–99)
HCT VFR BLD AUTO: 36 % (ref 34–46.6)
HDLC SERPL-MCNC: 77 MG/DL
HGB BLD-MCNC: 12.6 G/DL (ref 11.1–15.9)
IMM GRANULOCYTES # BLD AUTO: 0 X10E3/UL (ref 0–0.1)
IMM GRANULOCYTES NFR BLD AUTO: 0 %
LDLC SERPL CALC-MCNC: 143 MG/DL (ref 0–99)
LDLC/HDLC SERPL: 1.9 RATIO (ref 0–3.2)
LYMPHOCYTES # BLD AUTO: 1.1 X10E3/UL (ref 0.7–3.1)
LYMPHOCYTES NFR BLD AUTO: 22 %
MCH RBC QN AUTO: 32.3 PG (ref 26.6–33)
MCHC RBC AUTO-ENTMCNC: 35 G/DL (ref 31.5–35.7)
MCV RBC AUTO: 92 FL (ref 79–97)
MONOCYTES # BLD AUTO: 0.4 X10E3/UL (ref 0.1–0.9)
MONOCYTES NFR BLD AUTO: 8 %
NEUTROPHILS # BLD AUTO: 3.2 X10E3/UL (ref 1.4–7)
NEUTROPHILS NFR BLD AUTO: 65 %
PLATELET # BLD AUTO: 199 X10E3/UL (ref 150–450)
POTASSIUM SERPL-SCNC: 4.1 MMOL/L (ref 3.5–5.2)
PROT SERPL-MCNC: 6.7 G/DL (ref 6–8.5)
RBC # BLD AUTO: 3.9 X10E6/UL (ref 3.77–5.28)
SODIUM SERPL-SCNC: 143 MMOL/L (ref 134–144)
T4 FREE SERPL-MCNC: 0.83 NG/DL (ref 0.82–1.77)
TRIGL SERPL-MCNC: 104 MG/DL (ref 0–149)
TSH SERPL DL<=0.005 MIU/L-ACNC: 3.73 UIU/ML (ref 0.45–4.5)
VLDLC SERPL CALC-MCNC: 18 MG/DL (ref 5–40)
WBC # BLD AUTO: 4.9 X10E3/UL (ref 3.4–10.8)

## 2022-02-22 ENCOUNTER — OFFICE VISIT (OUTPATIENT)
Dept: FAMILY MEDICINE CLINIC | Facility: CLINIC | Age: 82
End: 2022-02-22

## 2022-02-22 VITALS
RESPIRATION RATE: 18 BRPM | HEART RATE: 55 BPM | WEIGHT: 146.8 LBS | DIASTOLIC BLOOD PRESSURE: 78 MMHG | TEMPERATURE: 96.8 F | SYSTOLIC BLOOD PRESSURE: 126 MMHG | HEIGHT: 64 IN | BODY MASS INDEX: 25.06 KG/M2 | OXYGEN SATURATION: 99 %

## 2022-02-22 DIAGNOSIS — R01.1 SYSTOLIC MURMUR: ICD-10-CM

## 2022-02-22 DIAGNOSIS — M85.80 OSTEOPENIA, UNSPECIFIED LOCATION: ICD-10-CM

## 2022-02-22 DIAGNOSIS — I10 PRIMARY HYPERTENSION: ICD-10-CM

## 2022-02-22 DIAGNOSIS — R73.9 HYPERGLYCEMIA: ICD-10-CM

## 2022-02-22 DIAGNOSIS — Z78.0 POST-MENOPAUSAL: ICD-10-CM

## 2022-02-22 DIAGNOSIS — E78.5 HYPERLIPIDEMIA, UNSPECIFIED HYPERLIPIDEMIA TYPE: Primary | ICD-10-CM

## 2022-02-22 DIAGNOSIS — E55.9 VITAMIN D DEFICIENCY DISEASE: ICD-10-CM

## 2022-02-22 PROCEDURE — 99214 OFFICE O/P EST MOD 30 MIN: CPT | Performed by: INTERNAL MEDICINE

## 2022-02-22 NOTE — PROGRESS NOTES
Gerson Colon is a 81 y.o. female. Patient is here today for follow-up on her hypertension, hyperlipidemia, hyperglycemia, vitamin D deficiency and osteopenia.  Patient has been refusing a bone density test but I went ahead and ordered it.  She has been stable but has not been taking her rosuvastatin or calcium supplement.  Chief Complaint   Patient presents with   • Hypertension     HYPERLIPIDEMIA, HYPERGLYCEMIA- F/U LABS          Vitals:    02/22/22 1330   BP: 126/78   Pulse: 55   Resp: 18   Temp: 96.8 °F (36 °C)   SpO2: 99%     Body mass index is 25.19 kg/m².  The following portions of the patient's history were reviewed and updated as appropriate: allergies, current medications, past family history, past medical history, past social history, past surgical history and problem list.    Past Medical History:   Diagnosis Date   • Hyperlipidemia    • Hypertension       No Known Allergies   Social History     Socioeconomic History   • Marital status:    Tobacco Use   • Smoking status: Former Smoker   • Smokeless tobacco: Never Used   Substance and Sexual Activity   • Alcohol use: No   • Drug use: Defer   • Sexual activity: Defer        Current Outpatient Medications:   •  calcium citrate-vitamin d (CITRACAL) 200-250 MG-UNIT tablet tablet, Take 2 tablets by mouth Daily., Disp: , Rfl:   •  methotrexate 2.5 MG tablet, Take 2.5 mg by mouth. Takes 8 a week, Disp: , Rfl:   •  rosuvastatin (CRESTOR) 10 MG tablet, TAKE ONE TABLET BY MOUTH DAILY, Disp: 90 tablet, Rfl: 3  •  sulindac (CLINORIL) 200 MG tablet, Take 200 mg by mouth., Disp: , Rfl:   •  fluticasone (FLONASE) 50 MCG/ACT nasal spray, 1 spray into the nostril(s) as directed by provider 2 (two) times a day for 14 days., Disp: 16 g, Rfl: 0     Objective     History of Present Illness     Review of Systems   All other systems reviewed and are negative.      Physical Exam  Vitals and nursing note reviewed.   Constitutional:       General: She is not in  acute distress.     Appearance: Normal appearance. She is not ill-appearing.   HENT:      Head: Normocephalic and atraumatic.   Cardiovascular:      Rate and Rhythm: Normal rate and regular rhythm.      Heart sounds: Murmur heard.        Comments: 2/6 to 3/6 systolic murmur at the upper sternal border  Pulmonary:      Effort: Pulmonary effort is normal. No respiratory distress.      Breath sounds: Normal breath sounds. No wheezing or rales.   Musculoskeletal:         General: Normal range of motion.      Cervical back: Normal range of motion.   Skin:     General: Skin is warm and dry.   Neurological:      General: No focal deficit present.      Mental Status: She is alert and oriented to person, place, and time.   Psychiatric:         Mood and Affect: Mood normal.         Behavior: Behavior normal.         ASSESSMENT CBC is normal.  CMP was normal aside from alkaline phosphatase 161.  Lipid panel is high off medication with a total cholesterol 238, HDL 77 .  TSH and free T4 are normal.  Vitamin D level is low off supplement at 16.9  #1-hypertension controlled  #2-systolic heart murmur, asymptomatic  #3-hyperlipidemia, not controlled off medication  #4-history of hyperglycemia with normal sugar today  #5-osteopenia  #6-vitamin D deficiency off of supplement     Problems Addressed this Visit        Cardiac and Vasculature    Hyperlipidemia - Primary    Hypertension    Systolic murmur       Endocrine and Metabolic    Hyperglycemia    Vitamin D deficiency disease       Musculoskeletal and Injuries    Osteopenia      Other Visit Diagnoses     Post-menopausal        Relevant Orders    DEXA Bone Density Axial      Diagnoses       Codes Comments    Hyperlipidemia, unspecified hyperlipidemia type    -  Primary ICD-10-CM: E78.5  ICD-9-CM: 272.4     Primary hypertension     ICD-10-CM: I10  ICD-9-CM: 401.9     Systolic murmur     ICD-10-CM: R01.1  ICD-9-CM: 785.2     Hyperglycemia     ICD-10-CM: R73.9  ICD-9-CM: 790.29      Vitamin D deficiency disease     ICD-10-CM: E55.9  ICD-9-CM: 268.9     Osteopenia, unspecified location     ICD-10-CM: M85.80  ICD-9-CM: 733.90     Post-menopausal     ICD-10-CM: Z78.0  ICD-9-CM: V49.81           PLAN patient will continue current medicines.  I want her to take her calcium vitamin D supplement such as Citracal regularly.  Also want her to get back on the rosuvastatin regularly.  I did order a bone density test on the patient as she is due and I would like to recheck her in 6 months with a CMP, lipid panel, vitamin D level    There are no Patient Instructions on file for this visit.  No follow-ups on file.

## 2022-04-13 ENCOUNTER — APPOINTMENT (OUTPATIENT)
Dept: BONE DENSITY | Facility: HOSPITAL | Age: 82
End: 2022-04-13

## 2022-07-14 ENCOUNTER — APPOINTMENT (OUTPATIENT)
Dept: BONE DENSITY | Facility: HOSPITAL | Age: 82
End: 2022-07-14

## 2022-08-10 ENCOUNTER — TELEPHONE (OUTPATIENT)
Dept: FAMILY MEDICINE CLINIC | Facility: CLINIC | Age: 82
End: 2022-08-10

## 2022-08-10 DIAGNOSIS — E78.5 HYPERLIPIDEMIA, UNSPECIFIED HYPERLIPIDEMIA TYPE: Primary | ICD-10-CM

## 2022-08-10 DIAGNOSIS — E55.9 VITAMIN D DEFICIENCY DISEASE: ICD-10-CM

## 2022-08-10 NOTE — TELEPHONE ENCOUNTER
Hub staff attempted to follow warm transfer process and was unsuccessful     Caller: Monica Colon    Relationship to patient: Self    Best call back number: [phone]    Patient is needing: PATIENT IS NEEDING TO RESCHEDULE HER LAB APPOINTMENT THAT IS ON 08.15.22.     PLEASE CALL TO RESCHEDULE FOR SOMETIME IN October.

## 2022-10-14 RX ORDER — ROSUVASTATIN CALCIUM 10 MG/1
TABLET, COATED ORAL
Qty: 90 TABLET | Refills: 3 | Status: SHIPPED | OUTPATIENT
Start: 2022-10-14

## 2022-12-29 DIAGNOSIS — E55.9 VITAMIN D DEFICIENCY DISEASE: ICD-10-CM

## 2022-12-29 DIAGNOSIS — E78.5 HYPERLIPIDEMIA, UNSPECIFIED HYPERLIPIDEMIA TYPE: Primary | ICD-10-CM

## 2023-01-10 ENCOUNTER — OFFICE VISIT (OUTPATIENT)
Dept: FAMILY MEDICINE CLINIC | Facility: CLINIC | Age: 83
End: 2023-01-10
Payer: MEDICARE

## 2023-01-10 VITALS
OXYGEN SATURATION: 98 % | SYSTOLIC BLOOD PRESSURE: 132 MMHG | DIASTOLIC BLOOD PRESSURE: 62 MMHG | HEART RATE: 71 BPM | RESPIRATION RATE: 18 BRPM | WEIGHT: 147.4 LBS | BODY MASS INDEX: 25.16 KG/M2 | HEIGHT: 64 IN | TEMPERATURE: 96.6 F

## 2023-01-10 DIAGNOSIS — G89.29 CHRONIC LOW BACK PAIN WITHOUT SCIATICA, UNSPECIFIED BACK PAIN LATERALITY: ICD-10-CM

## 2023-01-10 DIAGNOSIS — E55.9 VITAMIN D DEFICIENCY DISEASE: ICD-10-CM

## 2023-01-10 DIAGNOSIS — I10 PRIMARY HYPERTENSION: Primary | ICD-10-CM

## 2023-01-10 DIAGNOSIS — M54.50 CHRONIC LOW BACK PAIN WITHOUT SCIATICA, UNSPECIFIED BACK PAIN LATERALITY: ICD-10-CM

## 2023-01-10 DIAGNOSIS — R73.9 HYPERGLYCEMIA: ICD-10-CM

## 2023-01-10 DIAGNOSIS — E78.5 HYPERLIPIDEMIA, UNSPECIFIED HYPERLIPIDEMIA TYPE: ICD-10-CM

## 2023-01-10 DIAGNOSIS — M85.80 OSTEOPENIA, UNSPECIFIED LOCATION: ICD-10-CM

## 2023-01-10 DIAGNOSIS — R01.1 SYSTOLIC MURMUR: ICD-10-CM

## 2023-01-10 PROCEDURE — 1160F RVW MEDS BY RX/DR IN RCRD: CPT | Performed by: INTERNAL MEDICINE

## 2023-01-10 PROCEDURE — 1159F MED LIST DOCD IN RCRD: CPT | Performed by: INTERNAL MEDICINE

## 2023-01-10 PROCEDURE — 1126F AMNT PAIN NOTED NONE PRSNT: CPT | Performed by: INTERNAL MEDICINE

## 2023-01-10 PROCEDURE — 1125F AMNT PAIN NOTED PAIN PRSNT: CPT | Performed by: INTERNAL MEDICINE

## 2023-01-10 PROCEDURE — G0439 PPPS, SUBSEQ VISIT: HCPCS | Performed by: INTERNAL MEDICINE

## 2023-01-10 PROCEDURE — 1170F FXNL STATUS ASSESSED: CPT | Performed by: INTERNAL MEDICINE

## 2023-01-10 NOTE — PROGRESS NOTES
The ABCs of the Annual Wellness Visit  Subsequent Medicare Wellness Visit    Subjective    Monica Colon is a 82 y.o. female who presents for a Subsequent Medicare Wellness Visit.  She is also here for follow-up on her hypertension, hyperlipidemia, hyperglycemia, vitamin D deficiency and osteopenia.  Patient says her back bothers her some and her posture is not good and she would like to get with physical therapy.  Otherwise she has no acute complaints.  She does admit to not taking vitamin D regularly.    The following portions of the patient's history were reviewed and   updated as appropriate: allergies, current medications, past family history, past medical history, past social history, past surgical history and problem list.    Compared to one year ago, the patient feels her physical   health is the same.    Compared to one year ago, the patient feels her mental   health is the same.    Recent Hospitalizations:  She was not admitted to the hospital during the last year.       Current Medical Providers:  Patient Care Team:  Evan Borja MD as PCP - General    Outpatient Medications Prior to Visit   Medication Sig Dispense Refill   • calcium citrate-vitamin d (CITRACAL) 200-250 MG-UNIT tablet tablet Take 2 tablets by mouth Daily.     • rosuvastatin (CRESTOR) 10 MG tablet TAKE ONE TABLET BY MOUTH DAILY 90 tablet 3   • sulindac (CLINORIL) 200 MG tablet Take 200 mg by mouth.     • fluticasone (FLONASE) 50 MCG/ACT nasal spray 1 spray into the nostril(s) as directed by provider 2 (two) times a day for 14 days. 16 g 0   • methotrexate 2.5 MG tablet Take 2.5 mg by mouth. Takes 8 a week       No facility-administered medications prior to visit.       No opioid medication identified on active medication list. I have reviewed chart for other potential  high risk medication/s and harmful drug interactions in the elderly.          Aspirin is not on active medication list.  Aspirin use is not indicated based on review of  current medical condition/s. Risk of harm outweighs potential benefits.  .    Patient Active Problem List   Diagnosis   • Hyperglycemia   • Hyperlipidemia   • Hypertension   • Osteopenia   • Systolic murmur   • Vitamin D deficiency disease   • Arthritis     Advance Care Planning  Advance Directive is not on file.  ACP discussion was held with the patient during this visit. Patient has an advance directive (not in EMR), copy requested.     Objective    Vitals:    01/10/23 1107   BP: 132/62   BP Location: Right arm   Patient Position: Sitting   Pulse: 71   Resp: 18   Temp: 96.6 °F (35.9 °C)   TempSrc: Infrared   SpO2: 98%   Weight: 66.9 kg (147 lb 6.4 oz)   Height: 162.6 cm (64.02\")     Estimated body mass index is 25.29 kg/m² as calculated from the following:    Height as of this encounter: 162.6 cm (64.02\").    Weight as of this encounter: 66.9 kg (147 lb 6.4 oz).    BMI is >= 25 and <30. (Overweight) The following options were offered after discussion;: exercise counseling/recommendations      Does the patient have evidence of cognitive impairment? No    Lab Results   Component Value Date    CHLPL 183 01/05/2023    TRIG 105 01/05/2023    HDL 75 (H) 01/05/2023    LDL 89 01/05/2023    VLDL 19 01/05/2023        HEALTH RISK ASSESSMENT    Smoking Status:  Social History     Tobacco Use   Smoking Status Former   Smokeless Tobacco Never     Alcohol Consumption:  Social History     Substance and Sexual Activity   Alcohol Use No     Fall Risk Screen:    STEADI Fall Risk Assessment was completed, and patient is at MODERATE risk for falls. Assessment completed on:1/10/2023    Depression Screening:  PHQ-2/PHQ-9 Depression Screening 1/10/2023   Little Interest or Pleasure in Doing Things 0-->not at all   Feeling Down, Depressed or Hopeless 0-->not at all   PHQ-9: Brief Depression Severity Measure Score 0       Health Habits and Functional and Cognitive Screening:  Functional & Cognitive Status 1/10/2023   Do you have difficulty  preparing food and eating? No   Do you have difficulty bathing yourself, getting dressed or grooming yourself? No   Do you have difficulty using the toilet? No   Do you have difficulty moving around from place to place? No   Do you have trouble with steps or getting out of a bed or a chair? No   Current Diet Well Balanced Diet   Dental Exam Up to date   Eye Exam Up to date   Exercise (times per week) 0 times per week   Current Exercises Include No Regular Exercise   Current Exercise Activities Include -   Do you need help using the phone?  No   Are you deaf or do you have serious difficulty hearing?  Yes   Do you need help with transportation? No   Do you need help shopping? No   Do you need help preparing meals?  No   Do you need help with housework?  No   Do you need help with laundry? No   Do you need help taking your medications? No   Do you need help managing money? No   Do you ever drive or ride in a car without wearing a seat belt? No   Have you felt unusual stress, anger or loneliness in the last month? No   Who do you live with? Alone   If you need help, do you have trouble finding someone available to you? No   Have you been bothered in the last four weeks by sexual problems? No   Do you have difficulty concentrating, remembering or making decisions? Yes       Age-appropriate Screening Schedule:  Refer to the list below for future screening recommendations based on patient's age, sex and/or medical conditions. Orders for these recommended tests are listed in the plan section. The patient has been provided with a written plan.    Health Maintenance   Topic Date Due   • DXA SCAN  03/28/2019   • LIPID PANEL  01/05/2024   • TDAP/TD VACCINES (2 - Td or Tdap) 09/08/2025   • INFLUENZA VACCINE  Completed   • ZOSTER VACCINE  Completed   • MAMMOGRAM  Discontinued                CMS Preventative Services Quick Reference  Risk Factors Identified During Encounter  Fall Risk-High or Moderate: Discussed Fall Prevention  in the home and Referral Placed for Physical Therapy  Inactivity/Sedentary: Patient was advised to exercise at least 150 minutes a week per CDC recommendations.  At risk for osteoporosis and needs a bone density test but patient refuses  The above risks/problems have been discussed with the patient.  Pertinent information has been shared with the patient in the After Visit Summary.  An After Visit Summary and PPPS were made available to the patient.    Follow Up:   Next Medicare Wellness visit to be scheduled in 1 year.       Additional E&M Note during same encounter follows:  Patient has multiple medical problems which are significant and separately identifiable that require additional work above and beyond the Medicare Wellness Visit.      Chief Complaint  Hypertension (HYPERLIPIDEMIA, HYPERGLYCEMIA- F/U LABS )    Subjective        HPI  Monica Colon is also being seen today forhypertension, hyperlipidemia, hyperglycemia, vitamin D deficiency and osteopenia         Objective   Vital Signs:  /62 (BP Location: Right arm, Patient Position: Sitting)   Pulse 71   Temp 96.6 °F (35.9 °C) (Infrared)   Resp 18   Ht 162.6 cm (64.02\")   Wt 66.9 kg (147 lb 6.4 oz)   SpO2 98%   BMI 25.29 kg/m²     Physical Exam  Vitals and nursing note reviewed.   Constitutional:       General: She is not in acute distress.     Appearance: Normal appearance. She is not ill-appearing.   HENT:      Head: Normocephalic and atraumatic.   Cardiovascular:      Rate and Rhythm: Normal rate and regular rhythm.      Heart sounds: Murmur heard.      Comments: 1/6 to 2/6 systolic murmur at the upper sternal border  Pulmonary:      Effort: Pulmonary effort is normal. No respiratory distress.      Breath sounds: Normal breath sounds. No wheezing or rales.   Musculoskeletal:         General: Normal range of motion.   Skin:     General: Skin is warm and dry.   Neurological:      General: No focal deficit present.      Mental Status: She is alert  and oriented to person, place, and time.   Psychiatric:         Mood and Affect: Mood normal.         Behavior: Behavior normal.                         Assessment and Plan vitamin D level is improved but still low at 25.9.  Lipid panel has total cholesterol 183, HDL 75 and LDL 89.  CMP had an alkaline phosphatase is lower at 141 and was otherwise normal  #1-hypertension with reasonable blood pressure today, diet controlled  #2-hyperlipidemia, controlled on medication  #3-systolic heart murmur, asymptomatic  #4-history of hyperglycemia with normal sugar today, diet controlled  #5-vitamin D deficiency, partially corrected with supplement needs to take regularly       There are no diagnoses linked to this encounter.         Follow Up I recommended the patient get a bone density test but she refuses.  I am referring her to physical therapy for her low back discomfort.  I encouraged her to take her calcium vitamin D supplement regularly.  Plan on rechecking her in 6 months with a CBC, CMP, lipid panel, TSH and free T4 and vitamin D level       No follow-ups on file.  Patient was given instructions and counseling regarding her condition or for health maintenance advice. Please see specific information pulled into the AVS if appropriate.

## 2023-09-29 ENCOUNTER — TELEPHONE (OUTPATIENT)
Dept: FAMILY MEDICINE CLINIC | Facility: CLINIC | Age: 83
End: 2023-09-29

## 2023-09-29 NOTE — TELEPHONE ENCOUNTER
Caller: Monica Colon    Relationship: Self    Best call back number: [phone]     What is the medical concern/diagnosis: RIGHT KNEE PAIN    What specialty or service is being requested: PHYSICAL THERAPY    What is the provider, practice or medical service name: [facility]    What is the office location:     What is the office phone number:     Any additional details: PATIENT REQUESTS A REFERRAL TO [facility] FOR RIGHT KNEE PAIN.    PLEASE ADVISE.

## 2023-10-03 DIAGNOSIS — M25.569 KNEE PAIN, UNSPECIFIED CHRONICITY, UNSPECIFIED LATERALITY: Primary | ICD-10-CM

## 2023-10-04 ENCOUNTER — TELEPHONE (OUTPATIENT)
Dept: FAMILY MEDICINE CLINIC | Facility: CLINIC | Age: 83
End: 2023-10-04
Payer: MEDICARE

## 2023-10-04 DIAGNOSIS — M25.569 KNEE PAIN, UNSPECIFIED CHRONICITY, UNSPECIFIED LATERALITY: Primary | ICD-10-CM

## 2023-10-04 NOTE — TELEPHONE ENCOUNTER
REFERRAL RETURNED FROM [facility]. PER 9-29-23 TELEPHONE NOTE FROM PT, SHE IS REQUESTING A PHYSICAL THERAPY REFERRAL AND DR EMERY PUT IN A [facility] REFERRAL.     PLEASE HAVE DR MAZARIEGOS PUT IN A PHYSICAL THERAPY REFERRAL.    THANKS

## 2023-10-11 ENCOUNTER — OFFICE VISIT (OUTPATIENT)
Dept: FAMILY MEDICINE CLINIC | Facility: CLINIC | Age: 83
End: 2023-10-11
Payer: MEDICARE

## 2023-10-11 VITALS
BODY MASS INDEX: 27.46 KG/M2 | HEART RATE: 64 BPM | DIASTOLIC BLOOD PRESSURE: 78 MMHG | RESPIRATION RATE: 16 BRPM | HEIGHT: 62 IN | WEIGHT: 149.2 LBS | TEMPERATURE: 97.3 F | OXYGEN SATURATION: 97 % | SYSTOLIC BLOOD PRESSURE: 118 MMHG

## 2023-10-11 DIAGNOSIS — E55.9 VITAMIN D DEFICIENCY DISEASE: ICD-10-CM

## 2023-10-11 DIAGNOSIS — I10 PRIMARY HYPERTENSION: Primary | ICD-10-CM

## 2023-10-11 DIAGNOSIS — R73.9 HYPERGLYCEMIA: ICD-10-CM

## 2023-10-11 DIAGNOSIS — M19.90 ARTHRITIS: ICD-10-CM

## 2023-10-11 DIAGNOSIS — E78.5 HYPERLIPIDEMIA, UNSPECIFIED HYPERLIPIDEMIA TYPE: ICD-10-CM

## 2023-10-11 NOTE — PROGRESS NOTES
Gerson Colon is a 83 y.o. female. Patient is here today for right knee pain essentially.  She has not been taking any medications and has no recent lab tests to review.  She had significant right knee pain in late September and was seen at urgent care.  X-rays showed significant osteoarthritis but no fractures.  She would like to get back with physical therapy for it.  Chief Complaint   Patient presents with    Hyperlipidemia          Vitals:    10/11/23 1313   BP: 118/78   Pulse: 64   Resp: 16   Temp: 97.3 øF (36.3 øC)   SpO2: 97%     Body mass index is 27.28 kg/mý.  The following portions of the patient's history were reviewed and updated as appropriate: allergies, current medications, past family history, past medical history, past social history, past surgical history and problem list.    Past Medical History:   Diagnosis Date    Hyperlipidemia     Hypertension       No Known Allergies   Social History     Socioeconomic History    Marital status:    Tobacco Use    Smoking status: Former    Smokeless tobacco: Never   Vaping Use    Vaping Use: Never used   Substance and Sexual Activity    Alcohol use: No    Drug use: Defer    Sexual activity: Defer        Current Outpatient Medications:     fluticasone (FLONASE) 50 MCG/ACT nasal spray, 1 spray into the nostril(s) as directed by provider 2 (two) times a day for 14 days., Disp: 16 g, Rfl: 0    rosuvastatin (CRESTOR) 10 MG tablet, TAKE ONE TABLET BY MOUTH DAILY (Patient not taking: Reported on 10/11/2023), Disp: 90 tablet, Rfl: 3     Objective     History of Present Illness     Review of Systems    Physical Exam  Vitals and nursing note reviewed.   Constitutional:       General: She is not in acute distress.     Appearance: Normal appearance. She is not ill-appearing.   HENT:      Head: Normocephalic and atraumatic.   Cardiovascular:      Rate and Rhythm: Normal rate and regular rhythm.      Heart sounds: Normal heart sounds.   Pulmonary:       Effort: Pulmonary effort is normal. No respiratory distress.      Breath sounds: Normal breath sounds. No wheezing or rales.   Musculoskeletal:         General: Normal range of motion.   Skin:     General: Skin is warm and dry.   Neurological:      General: No focal deficit present.      Mental Status: She is alert and oriented to person, place, and time.   Psychiatric:         Mood and Affect: Mood normal.         Behavior: Behavior normal.         Assessment    ASSESSMENT #1-osteoarthritis of the right knee  #2-hypertension  #3-hyperlipidemia  #4-vitamin D deficiency  #5-history of hyperglycemia    Problems Addressed this Visit          Cardiac and Vasculature    Hyperlipidemia    Hypertension - Primary       Endocrine and Metabolic    Hyperglycemia    Vitamin D deficiency disease     Diagnoses         Codes Comments    Primary hypertension    -  Primary ICD-10-CM: I10  ICD-9-CM: 401.9     Hyperlipidemia, unspecified hyperlipidemia type     ICD-10-CM: E78.5  ICD-9-CM: 272.4     Vitamin D deficiency disease     ICD-10-CM: E55.9  ICD-9-CM: 268.9     Hyperglycemia     ICD-10-CM: R73.9  ICD-9-CM: 790.29             PLAN the patient received a flu shot today and I will recommended she get a COVID-19 and RSV vaccination.  I am referring her for physical therapy for her right knee.  I would like to recheck her in 1 month with a CBC, CMP, lipid panel, hemoglobin A1c, vitamin D level and TSH and free T4    There are no Patient Instructions on file for this visit.  No follow-ups on file.

## 2023-10-25 RX ORDER — ROSUVASTATIN CALCIUM 10 MG/1
TABLET, COATED ORAL
Qty: 90 TABLET | Refills: 3 | Status: SHIPPED | OUTPATIENT
Start: 2023-10-25

## 2023-10-31 ENCOUNTER — TREATMENT (OUTPATIENT)
Dept: PHYSICAL THERAPY | Facility: CLINIC | Age: 83
End: 2023-10-31
Payer: MEDICARE

## 2023-10-31 DIAGNOSIS — M25.561 CHRONIC PAIN OF RIGHT KNEE: Primary | ICD-10-CM

## 2023-10-31 DIAGNOSIS — R53.1 WEAKNESS GENERALIZED: ICD-10-CM

## 2023-10-31 DIAGNOSIS — R26.9 ABNORMAL GAIT: ICD-10-CM

## 2023-10-31 DIAGNOSIS — M99.06 SEGMENTAL AND SOMATIC DYSFUNCTION OF LOWER EXTREMITY: ICD-10-CM

## 2023-10-31 DIAGNOSIS — R26.89 DECREASED FUNCTIONAL MOBILITY: ICD-10-CM

## 2023-10-31 DIAGNOSIS — G89.29 CHRONIC PAIN OF RIGHT KNEE: Primary | ICD-10-CM

## 2023-10-31 PROCEDURE — 97112 NEUROMUSCULAR REEDUCATION: CPT | Performed by: PHYSICAL THERAPIST

## 2023-10-31 PROCEDURE — 97110 THERAPEUTIC EXERCISES: CPT | Performed by: PHYSICAL THERAPIST

## 2023-10-31 PROCEDURE — 97161 PT EVAL LOW COMPLEX 20 MIN: CPT | Performed by: PHYSICAL THERAPIST

## 2023-10-31 NOTE — PROGRESS NOTES
Physical Therapy Initial Evaluation and Plan of Care      Patient: Monica Colon   : 1940  Diagnosis/ICD-10 Code:  Chronic pain of right knee [M25.561, G89.29]  Referring practitioner: Evan Borja MD  Date of Initial Visit: 10/31/2023  Today's Date: 10/31/2023   Patient seen for 1 session  Location of Service: [place] [id number] [facility] - [address] [place] [address]       Visit Diagnoses:    ICD-10-CM ICD-9-CM   1. Chronic pain of right knee  M25.561 719.46    G89.29 338.29   2. Weakness generalized  R53.1 780.79   3. Segmental and somatic dysfunction of lower extremity  M99.06 739.6   4. Decreased functional mobility  R26.89 781.99   5. Abnormal gait  R26.9 781.2         Subjective  Chief Complaint/Subjective Report: Patient presented to the clinic today with complaints of pain in the R knee after twisting it a few weeks ago. Pt reported a PMH of knee issues and arthritis, see scanned and additional documents for further or additional PMH not mentioned at PT today. Pt made no reports of CNS signs or symptoms, or indications of other sinister pathologies were given in the subjective history today.   Mechanism of Injury: Unknown/Insidious; twisting it in an unknown way   Functional Limitations: ADLs, work-related activities  Subjective Goals for PT: Return to PLOF, decreased pain with ADLs and community activities  Prior Treatment for Current Condition: MD, PT, Brace  Imaging:Yes  Pain/VNRS (0-10/10): Worst: 7/10; Average: 2-3/10  Agg. Factors: Squatting, walking, twisting  Relieving Factors: brace, exercises  Subjective Questionnaire: LEFS: 36/80  PLOF: Independent with all functional tasks (transfers, ambulation, stair navigation, squatting, lifting, etc.), ADLs/ iADLs (bathing, dressing, house work, and other such tasks), and community activities (driving, shopping, wellness activities, etc.)   Occupation: Not referenced   PMH: See Subjective Report and scanned  documentation   Precautions/Contraindications: No reported contraindications from subjective history today unless otherwise stated above.      Objective  AROM (% of Full --- * denotes degrees in place of % --- + denotes tested to be WFL)        -- Thoracic Rotation Right:  - Left:  -    -- Side Bending Right  70 Left 65    -- Lumbar Flexion:   70      -- Lumbar Extension:  50                   AROM Lower Extremity (% of Full --- * denotes degrees in place of % --- + denotes tested to be WFL)     -- Hip Flexion Right:  - Left: -    -- Hip Extension Right: - Left: -    -- Hip ER in Seated Pos: - Left: -  -- Hip IR in Seated Pos: - Left: -  -- Knee Flexion Right:  90* Left: 106*  -- Knee Extension Right: -6* Left: -2*    LE MMT (0-5/5 --- + denotes WFL)  -- Hip Flexion Right:  -/5 Left: -/5  -- Hip Extension Right: 4/5 Left: 4/5  -- Hip Abduction Right: 4/5 Left: 4/5  -- Knee Flexion Right:  4-/5 Left: 4+/5  -- Knee Extension Right: 4/5 p Left: 4+/5  -- Ankle PF Right   -/5 Left: -/5  -- Ankle DF Right  -/5 Left: -/5      Functional Assessment: Impaired gait, wide base with decreased knee mobility throughout and decreased stride length    Moderate difficulty with sit to stand transitions most notable from lower surfaces or with spilt stance; sig difficult with eccentric control on transfers and stair stepping    Exercise and Interventions:  Access Code: [email or web address]  URL: [email or web address]  Date: 10/31/2023  Prepared by: Cristopher Cunningham    Exercises  - Mini Squat with Counter Support  - 1 x daily - 7 x weekly - 3 sets - 10 reps  - Supine Sciatic Nerve Glide  - 1 x daily - 7 x weekly - 3 sets - 8 reps  - Seated Knee Extension with Weight or Self Mobilization with Manual Pressure  - 2 x daily - 7 x weekly - 3 sets - 3 reps - 5-8 mobilizations  - Supine Heel Slide  - 1 x daily - 7 x weekly - 2 sets - 10 reps  - Therapeutic Neuroscience Education - Education on Pain Neuroscience, graded motor exposure, pain threshold,  alarm system and training levels, and progressive loading - 10 mins      Documentation of Assessment Details: Patient presented the clinic with signs and symptoms consistent with segmental dysfunction of the knee. Patient demonstrated limitations and impairments mobility, strength, and activity tolerance during today's evaluation, and will benefit from skilled PT address current limitations and impairments to help patient regain functional mobility and strength necessary to return to PLOF, reduce pain, and improve current symptoms as patient progresses towards meeting current goals established at therapy today. The patient present with no comorbidities or personal factors that impact my POC and deficit in above mentioned areas. Based on these findings and results from valid tests and measures, I am classifying this patient's presentation as stable with uncomplicated characteristics, and a good prognosis for recovery.         Assessment & Plan       Assessment  Impairments: abnormal gait, abnormal or restricted ROM, activity intolerance, impaired physical strength, lacks appropriate home exercise program and pain with function   Functional limitations: carrying objects, lifting, sleeping, walking, uncomfortable because of pain, sitting and standing   Prognosis details: Based on valid tests and measures performed today I am classifying this patient as presently stable with uncomplicated characteristics and good prognosis for recovery    Goals  Plan Goals: SHORT TERM GOALS (0-4 Weeks):  Pt will demonstrated independence and compliance with HEP within 2 weeks to demonstrate understanding of interventions and treatments helpful in reaching STG and LTG over the course of care. -- In Progress    Pt will improve LEFS and/or WOMC score by >20% of baseline impairment within 4 weeks to demonstrate improvements in test and measure outcomes and overall functionality -- In Progress    Pt will improve ROM of the knee by >50% of  deficit compared to contralateral extremity to demonstrate improved mobility to aid in independence with ADLs, functional mobility, and community participation activities. -- In Progress    Pt will reported pain <4/10 with ADLs and normal daily activities for >3 days/week over the past week to demonstrate functional improvements and activity tolerance and reduction in symptom severity -- In Progress    Pt will demonstrate improved 5x Sit to Stand and Single Leg Lateral Heel Taps performances to demonstrate improved functional mobility and ease of transitional movements (gait, sit to stand, kneeling to stand, squatting, etc) and stair ambulation to progress towards LTGs. -- In Progress      LONG TERM GOALS (6-8+ Weeks)   Pt will be able to ambulate for >30mins with pain <3/10 to allow for pain free functional activities such as home ADLs, work-related tasks, and community activities within 8 weeks to demonstrate improvements in functional independence, mobility, and community participation to allow for a return to PLOF. -- In Progress    Pt will demonstrate a >70% improvement in knee ROMs over baseline impairments within 8 weeks to demonstrate improvements in functional mobility and ability to complete ADLs and work-related activities Independently. -- In Progress    Pt will reported pain <3/10 with ADLs and normal daily activities for >5 days/week over the past week to demonstrate functional improvements and activity tolerance and reduction in symptom severity -- In Progress    Pt will improve LEFS and/or WOMC by >50%  over baseline to demonstrate improvements in subjective test and measure outcomes and overall functionality, and to show reduction of symptoms, improved activity tolerance, and ability to complete ADLs and work-related activities -- In Progress    Pt will be able to squat and lift an objects >20lbs without worsening of symptoms to demonstrate improvements in functional strength for ease of home and  community tasks and improved functional independence. -- In Progress    Pt will be able ascend and descent a flight of stairs with single rail assistance without worsening of symptoms to demonstrate improvements in functional strength and improved functional independence. -- In Progress    Pt will demonstrate a 20% improvement in 5x Sit to Stand time and Single Leg Lateral Heel Taps performance of >85% of uninvolved extremity to demonstrate improved functional capacity and independence with ADLs and community activities -- In Progress        Plan  Therapy options: will be seen for skilled therapy services  Planned modality interventions: dry needling, TENS, high voltage pulsed current (pain management), electrical stimulation/Russian stimulation and cryotherapy  Planned therapy interventions: ADL retraining, abdominal trunk stabilization, manual therapy, neuromuscular re-education, balance/weight-bearing training, body mechanics training, soft tissue mobilization, spinal/joint mobilization, joint mobilization, home exercise program, gait training, functional ROM exercises, strengthening, therapeutic activities and transfer training  Treatment plan discussed with: patient  Plan details: Duration: 2-3x/Wk for 4 Weeks - Upon completion of 4 weeks further evaluation and assessment will determine ongoing plan for continued care.    Continue with skilled physical therapy addressing previously mentioned limitations and impairments; progress HEP as tolerated; progress functional strengthening interventions to tolerance.    History # of Personal Factors and/or Comorbidities: LOW (0)  Examination of Body System(s): # of elements: LOW (1-2)  Clinical Presentation: STABLE   Clinical Decision Making: LOW       Timed:         Manual Therapy:    -     mins  64144;     Therapeutic Exercise:    20     mins  23106;     Neuromuscular Diallo:    10    mins  03961;    Therapeutic Activity:     -     mins  99249;     Gait Training:            mins  18020;     Ultrasound:          mins  86122;    Ionto                                  mins   62448  Self Care                           mins   77481  Canalith Repos         mins 55321    Un-Timed:  Electrical Stimulation:          mins  97883 ( );  Dry Needling         mins self-pay  Traction         mins 76013  Low Eval    15     Mins  13818  Mod Eval         Mins  84860  High Eval                            Mins  86058    Timed Treatment:   30   mins   Total Treatment:     45   mins      PT: Cristopher Cunningham PT     License Number: [id number]  Electronically signed by Cristopher Cunningham PT, 10/31/23, 9:50 AM EDT    Certification Period: 10/31/2023 thru 1/28/2024  I certify that the therapy services are furnished while this patient is under my care.  The services outlined above are required by this patient, and will be reviewed every 90 days.         Physician Signature:__________________________________________________    PHYSICIAN: Evan Borja MD  NPI: [id number]                                      DATE:      Please sign and return via fax to [email or web address] . Thank you, [facility].

## 2023-11-03 ENCOUNTER — TREATMENT (OUTPATIENT)
Dept: PHYSICAL THERAPY | Facility: CLINIC | Age: 83
End: 2023-11-03
Payer: MEDICARE

## 2023-11-03 DIAGNOSIS — G89.29 CHRONIC PAIN OF RIGHT KNEE: Primary | ICD-10-CM

## 2023-11-03 DIAGNOSIS — R53.1 WEAKNESS GENERALIZED: ICD-10-CM

## 2023-11-03 DIAGNOSIS — M99.06 SEGMENTAL AND SOMATIC DYSFUNCTION OF LOWER EXTREMITY: ICD-10-CM

## 2023-11-03 DIAGNOSIS — M25.561 CHRONIC PAIN OF RIGHT KNEE: Primary | ICD-10-CM

## 2023-11-07 ENCOUNTER — TREATMENT (OUTPATIENT)
Dept: PHYSICAL THERAPY | Facility: CLINIC | Age: 83
End: 2023-11-07
Payer: MEDICARE

## 2023-11-07 DIAGNOSIS — R53.1 WEAKNESS GENERALIZED: ICD-10-CM

## 2023-11-07 DIAGNOSIS — M99.06 SEGMENTAL AND SOMATIC DYSFUNCTION OF LOWER EXTREMITY: ICD-10-CM

## 2023-11-07 DIAGNOSIS — G89.29 CHRONIC PAIN OF RIGHT KNEE: Primary | ICD-10-CM

## 2023-11-07 DIAGNOSIS — R26.89 DECREASED FUNCTIONAL MOBILITY: ICD-10-CM

## 2023-11-07 DIAGNOSIS — R26.9 ABNORMAL GAIT: ICD-10-CM

## 2023-11-07 DIAGNOSIS — M25.561 CHRONIC PAIN OF RIGHT KNEE: Primary | ICD-10-CM

## 2023-11-10 ENCOUNTER — TREATMENT (OUTPATIENT)
Dept: PHYSICAL THERAPY | Facility: CLINIC | Age: 83
End: 2023-11-10
Payer: MEDICARE

## 2023-11-10 DIAGNOSIS — M99.06 SEGMENTAL AND SOMATIC DYSFUNCTION OF LOWER EXTREMITY: ICD-10-CM

## 2023-11-10 DIAGNOSIS — M25.561 CHRONIC PAIN OF RIGHT KNEE: Primary | ICD-10-CM

## 2023-11-10 DIAGNOSIS — R26.9 ABNORMAL GAIT: ICD-10-CM

## 2023-11-10 DIAGNOSIS — R26.89 DECREASED FUNCTIONAL MOBILITY: ICD-10-CM

## 2023-11-10 DIAGNOSIS — G89.29 CHRONIC PAIN OF RIGHT KNEE: Primary | ICD-10-CM

## 2023-11-10 DIAGNOSIS — R53.1 WEAKNESS GENERALIZED: ICD-10-CM

## 2023-11-10 NOTE — PROGRESS NOTES
Physical Therapy Daily Note    Patient: Monica Colon   : 1940  Diagnosis/ICD-10 Code:  Chronic pain of right knee [M25.561, G89.29]  Referring practitioner: Evan Borja MD  Date of Initial Visit: Type: THERAPY  Noted: 10/31/2023  Today's Date: 2023  Patient seen for 2 session  Location of Service: [facility]   [address] [facility] - [address] [place] [address]       Visit Diagnoses:    ICD-10-CM ICD-9-CM   1. Chronic pain of right knee  M25.561 719.46    G89.29 338.29   2. Segmental and somatic dysfunction of lower extremity  M99.06 739.6   3. Weakness generalized  R53.1 780.79            Subjective  Monica Colon reported today that she is doing well, sore for a bit after last session, but feels like things are improving    Objective   No functional measures updated at today's visit unless otherwise stated. For functional assessment and documentation of patient progressions referred to the assessment section.    See Exercise, Manual, and Modality Logs for complete treatments.       Assessment/Plan  Treatment today continued with progressive strengthening and dynamic stability. Pt continues to have difficulty with flexion and lifting, but has demonstrated mild improvements thus far with PT. Pt continues to have limitations in functional strength and mobility, and will continue to benefit from ongoing skilled PT to help pt continue to progress towards current LTGs and return to PLOF.    Plan: Continue with current treatment plan and progressions to reach goals established and previous evaluation/assessment         Timed:         Manual Therapy:         mins  21490;     Therapeutic Exercise:    20     mins  54293;     Neuromuscular Diallo:    10    mins  97040;    Therapeutic Activity:     10     mins  60095;     Gait Training:           mins  58397;     Ultrasound:          mins  73171;    Ionto                                   mins   42696  Self Care                             mins   55432  Canalith Repos         mins 68895    Un-Timed:  Electrical Stimulation:         mins  61068 ( );  Dry Needling          mins self-pay  Traction          mins 30746  Low Eval          Mins  00458  Mod Eval          Mins  82358  High Eval                            Mins  51364      Timed Treatment:   40   mins   Total Treatment:     40   mins      PT: Cristopher Cunningham PT     License Number: [id number]  Electronically signed by Cristopher Cunningham PT, 11/10/23, 12:47 PM EST

## 2023-11-14 ENCOUNTER — TREATMENT (OUTPATIENT)
Dept: PHYSICAL THERAPY | Facility: CLINIC | Age: 83
End: 2023-11-14
Payer: MEDICARE

## 2023-11-14 DIAGNOSIS — M99.06 SEGMENTAL AND SOMATIC DYSFUNCTION OF LOWER EXTREMITY: ICD-10-CM

## 2023-11-14 DIAGNOSIS — M25.561 CHRONIC PAIN OF RIGHT KNEE: Primary | ICD-10-CM

## 2023-11-14 DIAGNOSIS — G89.29 CHRONIC PAIN OF RIGHT KNEE: Primary | ICD-10-CM

## 2023-11-14 NOTE — PROGRESS NOTES
Physical Therapy Daily Note    Patient: Monica Colon   : 1940  Diagnosis/ICD-10 Code:  Chronic pain of right knee [M25.561, G89.29]  Referring practitioner: Evan Borja MD  Date of Initial Visit: Type: THERAPY  Noted: 10/31/2023  Today's Date: 2023  Patient seen for 3 session  Location of Service: [facility]   [address] [facility] - [address] [place] [address]       Visit Diagnoses:    ICD-10-CM ICD-9-CM   1. Chronic pain of right knee  M25.561 719.46    G89.29 338.29   2. Segmental and somatic dysfunction of lower extremity  M99.06 739.6   3. Weakness generalized  R53.1 780.79   4. Decreased functional mobility  R26.89 781.99   5. Abnormal gait  R26.9 781.2            Subjective  Monica Colon reported today that she is doing better, feels like things are improving    Objective   No functional measures updated at today's visit unless otherwise stated. For functional assessment and documentation of patient progressions referred to the assessment section.    See Exercise, Manual, and Modality Logs for complete treatments.       Assessment/Plan  Started treatment today with dynamic warm-up to prepare pt for interventions; tx today continued with end range mobility, neurodynamic mobility, and functional strengthening, which pt tolerated well and without complaint. Pt continues to have complaints in the aforementioned area, and continues to display limitations and impairments previously noted; pt will continue to benefit from ongoing skilled PT to help pt continue to progress towards current LTGs and return to PLOF.    Plan: Continue with current treatment plan and progressions to reach goals established and previous evaluation/assessment         Timed:         Manual Therapy:         mins  83085;     Therapeutic Exercise:    20     mins  85539;     Neuromuscular Diallo:    10    mins  93246;    Therapeutic Activity:     10     mins  30837;     Gait Training:           mins  15697;      Ultrasound:          mins  81564;    Ionto                                   mins   27997  Self Care                            mins   81552  Canalith Repos         mins 02208    Un-Timed:  Electrical Stimulation:         mins  24259 ( );  Dry Needling          mins self-pay  Traction          mins 64047  Low Eval          Mins  03158  Mod Eval          Mins  08926  High Eval                            Mins  74632      Timed Treatment:   40   mins   Total Treatment:     40   mins      PT: Cristopher Cunningham PT     License Number: [id number]  Electronically signed by Cristopher Cunningham PT, 11/14/23, 2:30 PM EST

## 2023-11-16 NOTE — PROGRESS NOTES
Physical Therapy Daily Note    Patient: Monica Colon   : 1940  Diagnosis/ICD-10 Code:  Chronic pain of right knee [M25.561, G89.29]  Referring practitioner: Evan Borja MD  Date of Initial Visit: Type: THERAPY  Noted: 10/31/2023  Today's Date: 11/10/2023  Patient seen for 4 session  Location of Service: [facility]   [address] [facility] - [address] [place] [address]       Visit Diagnoses:    ICD-10-CM ICD-9-CM   1. Chronic pain of right knee  M25.561 719.46    G89.29 338.29   2. Segmental and somatic dysfunction of lower extremity  M99.06 739.6   3. Weakness generalized  R53.1 780.79   4. Decreased functional mobility  R26.89 781.99   5. Abnormal gait  R26.9 781.2            Subjective  Monica Colon reported today that she is doing better, feels like things are improving    Objective   No functional measures updated at today's visit unless otherwise stated. For functional assessment and documentation of patient progressions referred to the assessment section.    See Exercise, Manual, and Modality Logs for complete treatments.       Assessment/Plan  Started treatment today with dynamic warm-up to prepare pt for interventions; tx today continued with end range mobility, neurodynamic mobility, and functional strengthening, which pt tolerated well and without complaint. Pt continues to have complaints in the aforementioned area, and continues to display limitations and impairments previously noted; pt will continue to benefit from ongoing skilled PT to help pt continue to progress towards current LTGs and return to PLOF.    Plan: Continue with current treatment plan and progressions to reach goals established and previous evaluation/assessment         Timed:         Manual Therapy:         mins  94067;     Therapeutic Exercise:    20     mins  45590;     Neuromuscular Diallo:    10    mins  49081;    Therapeutic Activity:     10     mins  55468;     Gait Training:           mins  66254;      Ultrasound:          mins  39811;    Ionto                                   mins   38240  Self Care                            mins   34560  Canalith Repos         mins 45458    Un-Timed:  Electrical Stimulation:         mins  33013 ( );  Dry Needling          mins self-pay  Traction          mins 58221  Low Eval          Mins  09544  Mod Eval          Mins  53441  High Eval                            Mins  27307      Timed Treatment:   40   mins   Total Treatment:     40   mins      PT: Cristopher Cunningham PT     License Number: [id number]  Electronically signed by Cristopher Cunningham PT, 11/16/23, 4:45 PM EST

## 2023-11-17 ENCOUNTER — TREATMENT (OUTPATIENT)
Dept: PHYSICAL THERAPY | Facility: CLINIC | Age: 83
End: 2023-11-17
Payer: MEDICARE

## 2023-11-17 DIAGNOSIS — R53.1 WEAKNESS GENERALIZED: ICD-10-CM

## 2023-11-17 DIAGNOSIS — M25.561 CHRONIC PAIN OF RIGHT KNEE: Primary | ICD-10-CM

## 2023-11-17 DIAGNOSIS — G89.29 CHRONIC PAIN OF RIGHT KNEE: Primary | ICD-10-CM

## 2023-11-17 DIAGNOSIS — R26.9 ABNORMAL GAIT: ICD-10-CM

## 2023-11-17 DIAGNOSIS — R26.89 DECREASED FUNCTIONAL MOBILITY: ICD-10-CM

## 2023-11-17 DIAGNOSIS — M99.06 SEGMENTAL AND SOMATIC DYSFUNCTION OF LOWER EXTREMITY: ICD-10-CM

## 2023-11-21 NOTE — PROGRESS NOTES
Physical Therapy Daily Note    Patient: Monica Colon   : 1940  Diagnosis/ICD-10 Code:  Chronic pain of right knee [M25.561, G89.29]  Referring practitioner: Evan Borja MD  Date of Initial Visit: Type: THERAPY  Noted: 10/31/2023  Today's Date: 2023  Patient seen for 5 session  Location of Service: [place] [address] [facility] - [address] [place] [address]       Visit Diagnoses:    ICD-10-CM ICD-9-CM   1. Chronic pain of right knee  M25.561 719.46    G89.29 338.29   2. Segmental and somatic dysfunction of lower extremity  M99.06 739.6            Subjective  Monica Colon reported today that she is doing better, feels like things are improving    Objective   No functional measures updated at today's visit unless otherwise stated. For functional assessment and documentation of patient progressions referred to the assessment section.    See Exercise, Manual, and Modality Logs for complete treatments.       Assessment/Plan  Started treatment today with dynamic warm-up to prepare pt for interventions; tx today continued with end range mobility, neurodynamic mobility, and functional strengthening, which pt tolerated well and without complaint. Pt continues to have complaints in the aforementioned area, and continues to display limitations and impairments previously noted; pt will continue to benefit from ongoing skilled PT to help pt continue to progress towards current LTGs and return to PLOF.    Plan: Continue with current treatment plan and progressions to reach goals established and previous evaluation/assessment         Timed:         Manual Therapy:         mins  70764;     Therapeutic Exercise:    20     mins  36721;     Neuromuscular Diallo:    10    mins  07450;    Therapeutic Activity:     10     mins  33105;     Gait Training:           mins  76622;     Ultrasound:          mins  95326;    Ionto                                   mins   67056  Self Care                             mins   13073  Canalith Repos         mins 74872    Un-Timed:  Electrical Stimulation:         mins  67820 ( );  Dry Needling          mins self-pay  Traction          mins 66119  Low Eval          Mins  03266  Mod Eval          Mins  49003  High Eval                            Mins  28331      Timed Treatment:   40   mins   Total Treatment:     40   mins      PT: Cristopher Cunningham PT     License Number: [id number]  Electronically signed by Cristopher Cunningham PT, 11/21/23, 8:45 AM EST

## 2023-11-28 ENCOUNTER — OFFICE VISIT (OUTPATIENT)
Dept: FAMILY MEDICINE CLINIC | Facility: CLINIC | Age: 83
End: 2023-11-28
Payer: MEDICARE

## 2023-11-28 ENCOUNTER — TELEPHONE (OUTPATIENT)
Dept: PHYSICAL THERAPY | Facility: CLINIC | Age: 83
End: 2023-11-28

## 2023-11-28 VITALS
HEIGHT: 62 IN | HEART RATE: 66 BPM | RESPIRATION RATE: 15 BRPM | DIASTOLIC BLOOD PRESSURE: 78 MMHG | BODY MASS INDEX: 27.77 KG/M2 | SYSTOLIC BLOOD PRESSURE: 120 MMHG | TEMPERATURE: 96 F | OXYGEN SATURATION: 97 % | WEIGHT: 150.9 LBS

## 2023-11-28 DIAGNOSIS — R73.9 HYPERGLYCEMIA: ICD-10-CM

## 2023-11-28 DIAGNOSIS — E78.5 HYPERLIPIDEMIA, UNSPECIFIED HYPERLIPIDEMIA TYPE: ICD-10-CM

## 2023-11-28 DIAGNOSIS — I10 PRIMARY HYPERTENSION: Primary | ICD-10-CM

## 2023-11-28 DIAGNOSIS — E55.9 VITAMIN D DEFICIENCY DISEASE: ICD-10-CM

## 2023-11-28 PROCEDURE — 3078F DIAST BP <80 MM HG: CPT | Performed by: INTERNAL MEDICINE

## 2023-11-28 PROCEDURE — 99214 OFFICE O/P EST MOD 30 MIN: CPT | Performed by: INTERNAL MEDICINE

## 2023-11-28 PROCEDURE — 3074F SYST BP LT 130 MM HG: CPT | Performed by: INTERNAL MEDICINE

## 2023-11-28 RX ORDER — MELATONIN
2000 DAILY
COMMUNITY

## 2023-11-28 RX ORDER — ROSUVASTATIN CALCIUM 10 MG/1
10 TABLET, COATED ORAL DAILY
Qty: 90 TABLET | Refills: 3 | Status: SHIPPED | OUTPATIENT
Start: 2023-11-28

## 2023-11-28 NOTE — PROGRESS NOTES
Gerson Colon is a 83 y.o. female. Patient is here today for follow-up on her hypertension, hyperlipidemia, vitamin D deficiency, hyperglycemia.  As usual the patient is not compliant with medications and has been off her vitamin D as well as her cholesterol medication.  She feels okay and has no acute complaints.  Chief Complaint   Patient presents with    Hypertension          Vitals:    11/28/23 1433   BP: 120/78   Pulse: 66   Resp: 15   Temp: 96 °F (35.6 °C)   SpO2: 97%     Body mass index is 27.59 kg/m².  The following portions of the patient's history were reviewed and updated as appropriate: allergies, current medications, past family history, past medical history, past social history, past surgical history and problem list.    Past Medical History:   Diagnosis Date    Hyperlipidemia     Hypertension       No Known Allergies   Social History     Socioeconomic History    Marital status:    Tobacco Use    Smoking status: Former    Smokeless tobacco: Never   Vaping Use    Vaping Use: Never used   Substance and Sexual Activity    Alcohol use: No    Drug use: Defer    Sexual activity: Defer        Current Outpatient Medications:     rosuvastatin (CRESTOR) 10 MG tablet, Take 1 tablet by mouth Daily., Disp: 90 tablet, Rfl: 3    Cholecalciferol 25 MCG (1000 UT) tablet, Take 2 tablets by mouth Daily., Disp: , Rfl:      Objective     History of Present Illness     Review of Systems    Physical Exam  Vitals and nursing note reviewed.   Constitutional:       General: She is not in acute distress.     Appearance: Normal appearance. She is not ill-appearing.   HENT:      Head: Normocephalic and atraumatic.   Cardiovascular:      Rate and Rhythm: Normal rate and regular rhythm.      Heart sounds: Normal heart sounds.   Pulmonary:      Effort: Pulmonary effort is normal. No respiratory distress.      Breath sounds: Normal breath sounds. No wheezing or rales.   Skin:     General: Skin is warm and dry.    Neurological:      General: No focal deficit present.      Mental Status: She is alert and oriented to person, place, and time.   Psychiatric:         Mood and Affect: Mood normal.         Behavior: Behavior normal.         Assessment    ASSESSMENT CBC is essentially normal.  CMP was stable with an alkaline phosphatase 144 and other values normal.  Lipid panel off her rosuvastatin was high with total cholesterol 243, HDL 73 and .  TSH and free T4 were normal and vitamin D level off supplement was quite low at 12.5.  #1-hypertension, diet controlled with normal value today  #2-hyperlipidemia, noncompliant with statins  #3-vitamin D deficiency, noncompliant with supplement  #4-history of hyperglycemia with normal sugar today    Problems Addressed this Visit          Cardiac and Vasculature    Hyperlipidemia    Relevant Medications    rosuvastatin (CRESTOR) 10 MG tablet    Hypertension - Primary       Endocrine and Metabolic    Hyperglycemia    Vitamin D deficiency disease     Diagnoses         Codes Comments    Primary hypertension    -  Primary ICD-10-CM: I10  ICD-9-CM: 401.9     Hyperlipidemia, unspecified hyperlipidemia type     ICD-10-CM: E78.5  ICD-9-CM: 272.4     Vitamin D deficiency disease     ICD-10-CM: E55.9  ICD-9-CM: 268.9     Hyperglycemia     ICD-10-CM: R73.9  ICD-9-CM: 790.29             PLAN the patient again promises that she will take her vitamin D about 2000 units daily and restart her rosuvastatin 10 mg daily.  I encouraged her to get a COVID-19 booster and the RSV vaccination.  She can be rechecked in 6 months with a CMP, lipid panel, vitamin D level    There are no Patient Instructions on file for this visit.  Return in about 6 months (around 5/28/2024) for with labs.

## 2023-11-28 NOTE — PROGRESS NOTES
Physical Therapy Daily Note    Patient: Monica Colon   : 1940  Diagnosis/ICD-10 Code:  Chronic pain of right knee [M25.561, G89.29]  Referring practitioner: Evan Borja MD  Date of Initial Visit: Type: THERAPY  Noted: 10/31/2023  Today's Date: 2023  Patient seen for 6 session  Location of Service: [facility]   [address] [facility] - [address] [place] [address]       Visit Diagnoses:    ICD-10-CM ICD-9-CM   1. Chronic pain of right knee  M25.561 719.46    G89.29 338.29   2. Segmental and somatic dysfunction of lower extremity  M99.06 739.6   3. Weakness generalized  R53.1 780.79   4. Decreased functional mobility  R26.89 781.99   5. Abnormal gait  R26.9 781.2            Subjective  Monica Colon reported today that she is doing better, feels like things are improving    Objective   No functional measures updated at today's visit unless otherwise stated. For functional assessment and documentation of patient progressions referred to the assessment section.    See Exercise, Manual, and Modality Logs for complete treatments.       Assessment/Plan  Started treatment today with dynamic warm-up to prepare pt for interventions; tx today continued with end range mobility, neurodynamic mobility, and functional strengthening, which pt tolerated well and without complaint. Pt continues to have complaints in the aforementioned area, and continues to display limitations and impairments previously noted; pt will continue to benefit from ongoing skilled PT to help pt continue to progress towards current LTGs and return to PLOF.    Plan: Continue with current treatment plan and progressions to reach goals established and previous evaluation/assessment         Timed:         Manual Therapy:         mins  83925;     Therapeutic Exercise:    20     mins  46657;     Neuromuscular Diallo:    10    mins  11171;    Therapeutic Activity:     10     mins  23326;     Gait Training:           mins  71984;      Ultrasound:          mins  97746;    Ionto                                   mins   92717  Self Care                            mins   91700  Canalith Repos         mins 42836    Un-Timed:  Electrical Stimulation:         mins  58714 ( );  Dry Needling          mins self-pay  Traction          mins 88479  Low Eval          Mins  58841  Mod Eval          Mins  75982  High Eval                            Mins  45240      Timed Treatment:   40   mins   Total Treatment:     40   mins      PT: Cristopher Cunningham PT     License Number: [id number]  Electronically signed by Cristopher Cunningham PT, 11/28/23, 2:17 PM EST

## 2023-12-04 ENCOUNTER — TREATMENT (OUTPATIENT)
Dept: PHYSICAL THERAPY | Facility: CLINIC | Age: 83
End: 2023-12-04
Payer: MEDICARE

## 2023-12-04 DIAGNOSIS — M99.06 SEGMENTAL AND SOMATIC DYSFUNCTION OF LOWER EXTREMITY: ICD-10-CM

## 2023-12-04 DIAGNOSIS — R26.89 DECREASED FUNCTIONAL MOBILITY: ICD-10-CM

## 2023-12-04 DIAGNOSIS — R26.9 ABNORMAL GAIT: ICD-10-CM

## 2023-12-04 DIAGNOSIS — M25.561 CHRONIC PAIN OF RIGHT KNEE: Primary | ICD-10-CM

## 2023-12-04 DIAGNOSIS — G89.29 CHRONIC PAIN OF RIGHT KNEE: Primary | ICD-10-CM

## 2023-12-04 DIAGNOSIS — R53.1 WEAKNESS GENERALIZED: ICD-10-CM

## 2023-12-07 NOTE — PROGRESS NOTES
Physical Therapy Daily Note    Patient: Monica Colon   : 1940  Diagnosis/ICD-10 Code:  Chronic pain of right knee [M25.561, G89.29]  Referring practitioner: Evan Borja MD  Date of Initial Visit: Type: THERAPY  Noted: 10/31/2023  Today's Date: 2023  Patient seen for 7 session  Location of Service: [facility]   [address] [facility] - [address] [place] [address]       Visit Diagnoses:    ICD-10-CM ICD-9-CM   1. Chronic pain of right knee  M25.561 719.46    G89.29 338.29   2. Segmental and somatic dysfunction of lower extremity  M99.06 739.6   3. Weakness generalized  R53.1 780.79   4. Decreased functional mobility  R26.89 781.99   5. Abnormal gait  R26.9 781.2            Subjective  Monica Colon reported today that she is doing better, feels like things are improving    Objective   No functional measures updated at today's visit unless otherwise stated. For functional assessment and documentation of patient progressions referred to the assessment section.    See Exercise, Manual, and Modality Logs for complete treatments.       Assessment/Plan  Started treatment today with dynamic warm-up to prepare pt for interventions; tx today continued with end range mobility, neurodynamic mobility, and functional strengthening, which pt tolerated well and without complaint. Pt continues to have complaints in the aforementioned area, and continues to display limitations and impairments previously noted; pt will continue to benefit from ongoing skilled PT to help pt continue to progress towards current LTGs and return to PLOF.    Plan: Continue with current treatment plan and progressions to reach goals established and previous evaluation/assessment         Timed:         Manual Therapy:         mins  68101;     Therapeutic Exercise:    20     mins  65286;     Neuromuscular Diallo:    10    mins  28826;    Therapeutic Activity:     10     mins  98441;     Gait Training:           mins  18912;      Ultrasound:          mins  31297;    Ionto                                   mins   28843  Self Care                            mins   47353  Canalith Repos         mins 27383    Un-Timed:  Electrical Stimulation:         mins  30217 ( );  Dry Needling          mins self-pay  Traction          mins 23498  Low Eval          Mins  59371  Mod Eval          Mins  47650  High Eval                            Mins  29431      Timed Treatment:   40   mins   Total Treatment:     40   mins      PT: Cristopher Cunningham PT     License Number: [id number]  Electronically signed by Cristopher Cunningham PT, 12/07/23, 1:28 PM EST

## 2023-12-14 ENCOUNTER — TREATMENT (OUTPATIENT)
Dept: PHYSICAL THERAPY | Facility: CLINIC | Age: 83
End: 2023-12-14
Payer: MEDICARE

## 2023-12-14 DIAGNOSIS — M25.561 CHRONIC PAIN OF RIGHT KNEE: Primary | ICD-10-CM

## 2023-12-14 DIAGNOSIS — M99.06 SEGMENTAL AND SOMATIC DYSFUNCTION OF LOWER EXTREMITY: ICD-10-CM

## 2023-12-14 DIAGNOSIS — G89.29 CHRONIC PAIN OF RIGHT KNEE: Primary | ICD-10-CM

## 2023-12-19 ENCOUNTER — TREATMENT (OUTPATIENT)
Dept: PHYSICAL THERAPY | Facility: CLINIC | Age: 83
End: 2023-12-19
Payer: MEDICARE

## 2023-12-19 DIAGNOSIS — M25.561 CHRONIC PAIN OF RIGHT KNEE: Primary | ICD-10-CM

## 2023-12-19 DIAGNOSIS — R53.1 WEAKNESS GENERALIZED: ICD-10-CM

## 2023-12-19 DIAGNOSIS — G89.29 CHRONIC PAIN OF RIGHT KNEE: Primary | ICD-10-CM

## 2023-12-19 DIAGNOSIS — M99.06 SEGMENTAL AND SOMATIC DYSFUNCTION OF LOWER EXTREMITY: ICD-10-CM

## 2023-12-21 NOTE — PROGRESS NOTES
Physical Therapy Daily Note    Patient: Monica Colon   : 1940  Diagnosis/ICD-10 Code:  Chronic pain of right knee [M25.561, G89.29]  Referring practitioner: Evan Borja MD  Date of Initial Visit: Type: THERAPY  Noted: 10/31/2023  Today's Date: 2023  Patient seen for 8 session  Location of Service: [place] [id number] [facility] - [address] [place] [address]       Visit Diagnoses:    ICD-10-CM ICD-9-CM   1. Chronic pain of right knee  M25.561 719.46    G89.29 338.29   2. Segmental and somatic dysfunction of lower extremity  M99.06 739.6            Subjective  Monica Colon reported today that she is doing better, feels like things are improving    Objective   No functional measures updated at today's visit unless otherwise stated. For functional assessment and documentation of patient progressions referred to the assessment section.    See Exercise, Manual, and Modality Logs for complete treatments.       Assessment/Plan  Started treatment today with dynamic warm-up to prepare pt for interventions; tx today continued with end range mobility, neurodynamic mobility, and functional strengthening, which pt tolerated well and without complaint. Pt continues to have complaints in the aforementioned area, and continues to display limitations and impairments previously noted; pt will continue to benefit from ongoing skilled PT to help pt continue to progress towards current LTGs and return to PLOF.    Plan: Continue with current treatment plan and progressions to reach goals established and previous evaluation/assessment         Timed:         Manual Therapy:         mins  81184;     Therapeutic Exercise:    20     mins  35554;     Neuromuscular Diallo:    10    mins  06215;    Therapeutic Activity:     10     mins  10859;     Gait Training:           mins  36837;     Ultrasound:          mins  52050;    Ionto                                   mins   91246  Self Care                             mins   00510  Canalith Repos         mins 12536    Un-Timed:  Electrical Stimulation:         mins  47209 ( );  Dry Needling          mins self-pay  Traction          mins 82802  Low Eval          Mins  19418  Mod Eval          Mins  22383  High Eval                            Mins  77896      Timed Treatment:   40   mins   Total Treatment:     40   mins      PT: Cristopher Cunningham PT     License Number: [id number]  Electronically signed by Cristopher Cunningham PT, 12/21/23, 8:49 AM EST

## 2023-12-27 NOTE — PROGRESS NOTES
Physical Therapy Daily Note    Patient: Monica Colon   : 1940  Diagnosis/ICD-10 Code:  Chronic pain of right knee [M25.561, G89.29]  Referring practitioner: Evan Borja MD  Date of Initial Visit: Type: THERAPY  Noted: 10/31/2023  Today's Date: 2023  Patient seen for 9 session  Location of Service: [facility]   [address] [facility] [address]   [place] [address]       Visit Diagnoses:    ICD-10-CM ICD-9-CM   1. Chronic pain of right knee  M25.561 719.46    G89.29 338.29   2. Segmental and somatic dysfunction of lower extremity  M99.06 739.6   3. Weakness generalized  R53.1 780.79            Subjective  Monica Colon reported today that she feels like things are improving. Still having a number of issues, but feels like it's going in the right direction    Objective   No functional measures updated at today's visit unless otherwise stated. For functional assessment and documentation of patient progressions referred to the assessment section.    See Exercise, Manual, and Modality Logs for complete treatments.       Assessment/Plan  Treatment today continued with progressive strengthening to tolerance and neurodynamic mobility to help with pain and sensitization. Pt tolerated treatment well today with rest intervals added throughout as needed to allow for recovery; pt continues to have difficulty with balance, strength, and activity tolerance, but appears to be making improvements each session. Pt continues to have limitations in functional strength and mobility, and will continue to benefit from ongoing skilled PT to help pt continue to progress towards current LTGs and return to PLOF.    Plan: Continue with current treatment plan and progressions to reach goals established and previous evaluation/assessment         Timed:         Manual Therapy:         mins  24446;     Therapeutic Exercise:    20     mins  90183;     Neuromuscular Diallo:    15    mins  43066;    Therapeutic  Activity:     10     mins  12321;     Gait Training:           mins  41392;     Ultrasound:          mins  39336;    Ionto                                   mins   13479  Self Care                            mins   99039  Canalith Repos         mins 46900    Un-Timed:  Electrical Stimulation:         mins  57470 ( );  Dry Needling          mins self-pay  Traction          mins 56746  Low Eval          Mins  54673  Mod Eval          Mins  32458  High Eval                            Mins  95414      Timed Treatment:   45   mins   Total Treatment:     45   mins      PT: Cristopher Cunningham PT     License Number: [id number]  Electronically signed by Cristopher Cunningham PT, 12/27/23, 7:48 AM EST

## 2024-03-20 ENCOUNTER — PATIENT MESSAGE (OUTPATIENT)
Dept: FAMILY MEDICINE CLINIC | Facility: CLINIC | Age: 84
End: 2024-03-20
Payer: MEDICARE

## 2024-03-20 ENCOUNTER — TELEPHONE (OUTPATIENT)
Dept: FAMILY MEDICINE CLINIC | Facility: CLINIC | Age: 84
End: 2024-03-20
Payer: MEDICARE

## 2024-03-20 NOTE — TELEPHONE ENCOUNTER
Hub to read: Hello,   I'm writing to inform you that Dr. London is no longer with [facility]. Your future appointments have been cancelled. I can easily transfer your care to another provider in our office including:     Dr. Maribel Mejia, VALER[facility]     Please call our office and we will be glad to assist you. Our office number is [phone].  Thank you for entrusting us with your care. We apologize for the inconvenience.     Sincerely,  [facility]

## 2024-10-31 ENCOUNTER — TELEPHONE (OUTPATIENT)
Dept: FAMILY MEDICINE CLINIC | Facility: CLINIC | Age: 84
End: 2024-10-31
Payer: MEDICARE

## 2024-10-31 NOTE — TELEPHONE ENCOUNTER
HUB TO RELAY IF THERE IS A CALLBACK    PT HAS EST NEW PROV APPT WITH [facility] 11/6, [facility] RN CALLED REQUESTING [facility] ORDER FOR PT SINCE FAMILY THINKS SHE POSSIBLY HAS A UTI. RELAYED TO RN THAT WE CANNOT DO THIS FOR PATIENT BECAUSE SHE IS TECHNICALLY NOT ESTABLISHED HERE RIGHT NOW ANYMORE SINCE SHE HAS NOT BEEN SEEN IN OFFICE SINCE DR EMERY RETIRED. PT WOULD HAVE TO SEE PROVIDER IN OFFICE BEFORE THIS COULD BE ORDERED. IF THEY WOULD LIKE, THEY CAN SET UP TO BE SEEN EARLIER THAN 11/6 IF THERE IS AVAILABILITY.    THANK YOU

## 2024-11-06 ENCOUNTER — OFFICE VISIT (OUTPATIENT)
Dept: FAMILY MEDICINE CLINIC | Facility: CLINIC | Age: 84
End: 2024-11-06
Payer: COMMERCIAL

## 2024-11-06 VITALS
SYSTOLIC BLOOD PRESSURE: 120 MMHG | DIASTOLIC BLOOD PRESSURE: 62 MMHG | OXYGEN SATURATION: 95 % | TEMPERATURE: 98 F | HEART RATE: 83 BPM | RESPIRATION RATE: 16 BRPM | BODY MASS INDEX: 22.65 KG/M2 | HEIGHT: 62 IN | WEIGHT: 123.1 LBS

## 2024-11-06 DIAGNOSIS — I10 PRIMARY HYPERTENSION: Primary | ICD-10-CM

## 2024-11-06 DIAGNOSIS — R41.841 COGNITIVE COMMUNICATION DEFICIT: ICD-10-CM

## 2024-11-06 DIAGNOSIS — E78.2 MIXED HYPERLIPIDEMIA: ICD-10-CM

## 2024-11-06 DIAGNOSIS — R45.1 RESTLESSNESS AND AGITATION: ICD-10-CM

## 2024-11-06 DIAGNOSIS — E87.1 HYPONATREMIA: ICD-10-CM

## 2024-11-06 DIAGNOSIS — R45.1 AGITATION: ICD-10-CM

## 2024-11-06 DIAGNOSIS — E55.9 VITAMIN D DEFICIENCY, UNSPECIFIED: ICD-10-CM

## 2024-11-06 DIAGNOSIS — R41.0 CONFUSION: ICD-10-CM

## 2024-11-06 DIAGNOSIS — F22 PARANOIA: ICD-10-CM

## 2024-11-06 PROBLEM — R13.10 DYSPHAGIA, UNSPECIFIED: Status: ACTIVE | Noted: 2024-10-15

## 2024-11-06 PROBLEM — D64.9 ANEMIA, UNSPECIFIED: Status: ACTIVE | Noted: 2024-10-15

## 2024-11-06 PROBLEM — M06.9 RHEUMATOID ARTHRITIS, UNSPECIFIED: Status: ACTIVE | Noted: 2024-10-15

## 2024-11-06 PROBLEM — Z91.81 HISTORY OF FALLING: Status: ACTIVE | Noted: 2024-10-15

## 2024-11-06 PROBLEM — I21.4 NON-ST ELEVATION (NSTEMI) MYOCARDIAL INFARCTION: Status: ACTIVE | Noted: 2024-10-15

## 2024-11-06 PROBLEM — H49.20: Status: ACTIVE | Noted: 2024-10-30

## 2024-11-06 PROBLEM — I60.9 SUBARACHNOID HEMORRHAGE: Status: ACTIVE | Noted: 2024-10-25

## 2024-11-06 PROBLEM — E78.5 HYPERLIPIDEMIA, UNSPECIFIED: Status: ACTIVE | Noted: 2024-10-15

## 2024-11-06 PROBLEM — G47.00 INSOMNIA, UNSPECIFIED: Status: ACTIVE | Noted: 2024-10-15

## 2024-11-06 PROCEDURE — 99214 OFFICE O/P EST MOD 30 MIN: CPT | Performed by: NURSE PRACTITIONER

## 2024-11-06 RX ORDER — ASPIRIN 81 MG/1
81 TABLET, CHEWABLE ORAL DAILY
Qty: 90 TABLET | Refills: 1 | Status: SHIPPED | OUTPATIENT
Start: 2024-11-06

## 2024-11-06 RX ORDER — ATORVASTATIN CALCIUM 40 MG/1
40 TABLET, FILM COATED ORAL DAILY
Qty: 90 TABLET | Refills: 1 | Status: SHIPPED | OUTPATIENT
Start: 2024-11-06

## 2024-11-06 RX ORDER — ASPIRIN 81 MG/1
81 TABLET, CHEWABLE ORAL DAILY
COMMUNITY
End: 2024-11-06 | Stop reason: SDUPTHER

## 2024-11-06 RX ORDER — ATORVASTATIN CALCIUM 40 MG/1
40 TABLET, FILM COATED ORAL DAILY
COMMUNITY
End: 2024-11-06 | Stop reason: SDUPTHER

## 2024-11-06 RX ORDER — AMLODIPINE BESYLATE 5 MG/1
5 TABLET ORAL DAILY
COMMUNITY
End: 2024-11-06 | Stop reason: SDUPTHER

## 2024-11-06 RX ORDER — AMLODIPINE BESYLATE 5 MG/1
5 TABLET ORAL DAILY
Qty: 90 TABLET | Refills: 1 | Status: SHIPPED | OUTPATIENT
Start: 2024-11-06

## 2024-11-06 NOTE — PROGRESS NOTES
"Gerson Colon is a 84 y.o.. female.     Patient here today with her daughter to become established.     Patient with history of hypertension and hyperlipidemia. Patient's daughter stating Patient eats healthy but does not much water.     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     Patient's daughter stating the family has been staying with Patient 24 hours a day 7 days a week. Patient's daughter stating they are looking into getting home health aids.     Patient sees specialists:   Dr. Serrano-cardiology  Dr. Mireles-neurosurgery  Dr. Quinones-[facility]  Dr. Naranjo-ENT  Dr. Armas-[facility]          The following portions of the patient's history were reviewed and updated as appropriate: allergies, current medications, past family history, past medical history, past social history, past surgical history and problem list.    Past Medical History:   Diagnosis Date    Hyperlipidemia     Hypertension        Past Surgical History:   Procedure Laterality Date    NO PAST SURGERIES         Review of Systems   Constitutional: Negative.    Respiratory: Negative.     Cardiovascular: Negative.    Neurological: Negative.    Psychiatric/Behavioral:  Positive for agitation, behavioral problems, confusion and decreased concentration. Negative for self-injury, sleep disturbance and suicidal ideas. The patient is nervous/anxious.        No Known Allergies    Objective     Vitals:    11/06/24 1320   BP: 120/62   BP Location: Right arm   Patient Position: Sitting   Cuff Size: Adult   Pulse: 83   Resp: 16   Temp: 98 °F (36.7 °C)   TempSrc: Temporal   SpO2: 95%   Weight: 55.8 kg (123 lb 1.6 oz)   Height: 157.5 cm (62.01\")     Body mass index is 22.51 kg/m².    Physical Exam  Vitals reviewed.   HENT:      Head: Normocephalic and atraumatic.   Eyes:      Pupils: Pupils are equal, round, and reactive to light.   Cardiovascular:      Rate and Rhythm: Normal rate and regular rhythm.      Pulses: Normal " pulses.   Pulmonary:      Effort: Pulmonary effort is normal.      Breath sounds: Normal breath sounds.   Musculoskeletal:         General: Normal range of motion.   Skin:     General: Skin is warm and dry.   Neurological:      Mental Status: She is alert.   Psychiatric:         Mood and Affect: Mood is elated.         Speech: Speech is rapid and pressured.         Behavior: Behavior is agitated (at times).           Current Outpatient Medications:     amLODIPine (NORVASC) 5 MG tablet, Take 1 tablet by mouth Daily., Disp: 90 tablet, Rfl: 1    aspirin 81 MG chewable tablet, Chew 1 tablet Daily., Disp: 90 tablet, Rfl: 1    atorvastatin (LIPITOR) 40 MG tablet, Take 1 tablet by mouth Daily., Disp: 90 tablet, Rfl: 1    vitamin D3 125 MCG (5000 UT) capsule capsule, Take 1 capsule by mouth Daily., Disp: 90 capsule, Rfl: 1    Recent Results (from the past 12 weeks)   ZZZTOXICOLOGY SCREEN, SERUM    Collection Time: 09/18/24  2:14 PM    Specimen: Blood   Result Value Ref Range    Methanol NEGATIVE Negative    Acetone NEGATIVE Negative    Ethanol Screen Urine (Ref) NEGATIVE Negative    Isopropyl Ether NEGATIVE Negative    Acetaminophen <10 10 - 30 mcg/mL    Salicylate <4.0 (L) 5.0 - 30.0 mg/dL    Collected by [facility]     Performed by: [facility]    LIPASE    Collection Time: 09/18/24  2:14 PM    Specimen: Blood   Result Value Ref Range    Lipase 27 10 - 50 Units/Liter   AUTODIFF    Collection Time: 09/18/24  2:14 PM    Specimen: Blood   Result Value Ref Range    Neutrophil % 83.5 (H) 34.0 - 69.5 %    Lymphocyte % 10.6 (L) 20.0 - 53.0 %    Monocyte % 4.6 (L) 5.0 - 12.5 %    Eosinophil % 1.1 0.7 - 6.0 %    Basophil % 0.2 0.0 - 3.0 %    Neutrophils Absolute 10.7 (H) 1.7 - 6.0 x10(3)/ul    Lymphocytes Absolute 1.4 0.8 - 3.2 x10(3)/ul    Monocytes Absolute 0.6 0.2 - 1.4 x10(3)/ul    Eosinophils Absolute 0.1 0.0 - 0.6 x10(3)/ul    Basophils Absolute 0.0 0.0 - 0.3 x10(3)/ul   ANTIBODY SCREEN    Collection Time: 09/18/24  2:51 PM     Specimen: Blood   Result Value Ref Range    Antibody Screen Negative ABSC    ABO/RH    Collection Time: 09/18/24  2:51 PM    Specimen: Blood   Result Value Ref Range    ABORh No Prev Hx     Method: Grifols 1     Anti-A 4+     Anti-B -     Anti-D 4+     A1 Cell -     B Cell 3+     ABORh A POS    ABORH 2ND SPECIMEN VERIFICATION    Collection Time: 09/18/24  4:42 PM    Specimen: Blood   Result Value Ref Range    Method: Grifols 1     Anti-A 4+     Anti-B -     Anti-D 4+     A1 Cell -     B Cell 2+     ABO/RH Recheck A POS    APTT    Collection Time: 09/18/24  4:42 PM    Specimen: Blood   Result Value Ref Range    PTT 21.7 (L) 23.3 - 33.2 Second   PROTIME-INR    Collection Time: 09/18/24  4:42 PM    Specimen: Blood   Result Value Ref Range    Protime 11.4 9.7 - 11.5 Second    INR 1.1    CBC & Differential    Collection Time: 11/06/24  2:32 PM    Specimen: Blood   Result Value Ref Range    WBC 6.2 3.4 - 10.8 x10E3/uL    RBC 3.70 (L) 3.77 - 5.28 x10E6/uL    Hemoglobin 11.1 11.1 - 15.9 g/dL    Hematocrit 34.1 34.0 - 46.6 %    MCV 92 79 - 97 fL    MCH 30.0 26.6 - 33.0 pg    MCHC 32.6 31.5 - 35.7 g/dL    RDW 13.8 11.7 - 15.4 %    Platelets 304 150 - 450 x10E3/uL    Neutrophil Rel % 71 Not Estab. %    Lymphocyte Rel % 16 Not Estab. %    Monocyte Rel % 9 Not Estab. %    Eosinophil Rel % 3 Not Estab. %    Basophil Rel % 1 Not Estab. %    Neutrophils Absolute 4.4 1.4 - 7.0 x10E3/uL    Lymphocytes Absolute 1.0 0.7 - 3.1 x10E3/uL    Monocytes Absolute 0.6 0.1 - 0.9 x10E3/uL    Eosinophils Absolute 0.2 0.0 - 0.4 x10E3/uL    Basophils Absolute 0.0 0.0 - 0.2 x10E3/uL    Immature Granulocyte Rel % 0 Not Estab. %    Immature Grans Absolute 0.0 0.0 - 0.1 x10E3/uL   Comprehensive metabolic panel    Collection Time: 11/06/24  2:32 PM    Specimen: Blood   Result Value Ref Range    Glucose 130 (H) 70 - 99 mg/dL    BUN 11 8 - 27 mg/dL    Creatinine 0.74 0.57 - 1.00 mg/dL    EGFR Result 79 >59 mL/min/1.73    BUN/Creatinine Ratio 15 12 - 28     Sodium 137 134 - 144 mmol/L    Potassium 4.0 3.5 - 5.2 mmol/L    Chloride 99 96 - 106 mmol/L    Total CO2 22 20 - 29 mmol/L    Calcium 9.6 8.7 - 10.3 mg/dL    Total Protein 6.7 6.0 - 8.5 g/dL    Albumin 4.1 3.7 - 4.7 g/dL    Globulin 2.6 1.5 - 4.5 g/dL    Total Bilirubin 0.5 0.0 - 1.2 mg/dL    Alkaline Phosphatase 155 (H) 44 - 121 IU/L    AST (SGOT) 25 0 - 40 IU/L    ALT (SGPT) 19 0 - 32 IU/L   Unable To Void    Collection Time: 11/06/24  2:32 PM   Result Value Ref Range    Unable to Void Comment          Diagnoses and all orders for this visit:    1. Primary hypertension (Primary)  -     amLODIPine (NORVASC) 5 MG tablet; Take 1 tablet by mouth Daily.  Dispense: 90 tablet; Refill: 1  -     aspirin 81 MG chewable tablet; Chew 1 tablet Daily.  Dispense: 90 tablet; Refill: 1    2. Mixed hyperlipidemia  -     atorvastatin (LIPITOR) 40 MG tablet; Take 1 tablet by mouth Daily.  Dispense: 90 tablet; Refill: 1    3. Hyponatremia  -     Comprehensive metabolic panel    4. Vitamin D deficiency, unspecified  -     vitamin D3 125 MCG (5000 UT) capsule capsule; Take 1 capsule by mouth Daily.  Dispense: 90 capsule; Refill: 1    5. Confusion  -     Ambulatory Referral to Case Management  -     Ambulatory Referral to Neurology  -     CBC & Differential  -     Urinalysis With Microscopic - Urine, Clean Catch    6. Paranoia  -     Ambulatory Referral to Case Management  -     Ambulatory Referral to Neurology    7. Agitation  -     Ambulatory Referral to Case Management  -     Ambulatory Referral to Neurology    8. Cognitive communication deficit  -     Ambulatory Referral to Case Management  -     Ambulatory Referral to Neurology    9. Restlessness and agitation  -     Ambulatory Referral to Case Management  -     Ambulatory Referral to Neurology    Other orders  -     Unable To Void        Patient Instructions   Hypertension: stable, continue amlodipine 5 mg 1 tab daily    Hyperlipidemia: continue atorvastatin 40 mg 1 tab daily,  ordering fasting labs before next office visit    Hyponatremia: ordering cmp for further eval.    Vitamin D deficiency: continue daily supplement    Confusion/paranoia/agitation/cognitive communication deficit/restless and agitated: referring to neurology and case management    Return for recheck in 1 month.

## 2024-11-07 ENCOUNTER — REFERRAL TRIAGE (OUTPATIENT)
Dept: CASE MANAGEMENT | Facility: OTHER | Age: 84
End: 2024-11-07
Payer: MEDICARE

## 2024-11-07 ENCOUNTER — TELEPHONE (OUTPATIENT)
Dept: FAMILY MEDICINE CLINIC | Facility: CLINIC | Age: 84
End: 2024-11-07
Payer: MEDICARE

## 2024-11-07 LAB
ALBUMIN SERPL-MCNC: 4.1 G/DL (ref 3.7–4.7)
ALP SERPL-CCNC: 155 IU/L (ref 44–121)
ALT SERPL-CCNC: 19 IU/L (ref 0–32)
AST SERPL-CCNC: 25 IU/L (ref 0–40)
BASOPHILS # BLD AUTO: 0 X10E3/UL (ref 0–0.2)
BASOPHILS NFR BLD AUTO: 1 %
BILIRUB SERPL-MCNC: 0.5 MG/DL (ref 0–1.2)
BUN SERPL-MCNC: 11 MG/DL (ref 8–27)
BUN/CREAT SERPL: 15 (ref 12–28)
CALCIUM SERPL-MCNC: 9.6 MG/DL (ref 8.7–10.3)
CHLORIDE SERPL-SCNC: 99 MMOL/L (ref 96–106)
CO2 SERPL-SCNC: 22 MMOL/L (ref 20–29)
CREAT SERPL-MCNC: 0.74 MG/DL (ref 0.57–1)
EGFRCR SERPLBLD CKD-EPI 2021: 79 ML/MIN/1.73
EOSINOPHIL # BLD AUTO: 0.2 X10E3/UL (ref 0–0.4)
EOSINOPHIL NFR BLD AUTO: 3 %
ERYTHROCYTE [DISTWIDTH] IN BLOOD BY AUTOMATED COUNT: 13.8 % (ref 11.7–15.4)
GLOBULIN SER CALC-MCNC: 2.6 G/DL (ref 1.5–4.5)
GLUCOSE SERPL-MCNC: 130 MG/DL (ref 70–99)
HCT VFR BLD AUTO: 34.1 % (ref 34–46.6)
HGB BLD-MCNC: 11.1 G/DL (ref 11.1–15.9)
IMM GRANULOCYTES # BLD AUTO: 0 X10E3/UL (ref 0–0.1)
IMM GRANULOCYTES NFR BLD AUTO: 0 %
LYMPHOCYTES # BLD AUTO: 1 X10E3/UL (ref 0.7–3.1)
LYMPHOCYTES NFR BLD AUTO: 16 %
MCH RBC QN AUTO: 30 PG (ref 26.6–33)
MCHC RBC AUTO-ENTMCNC: 32.6 G/DL (ref 31.5–35.7)
MCV RBC AUTO: 92 FL (ref 79–97)
MONOCYTES # BLD AUTO: 0.6 X10E3/UL (ref 0.1–0.9)
MONOCYTES NFR BLD AUTO: 9 %
NEUTROPHILS # BLD AUTO: 4.4 X10E3/UL (ref 1.4–7)
NEUTROPHILS NFR BLD AUTO: 71 %
PLATELET # BLD AUTO: 304 X10E3/UL (ref 150–450)
POTASSIUM SERPL-SCNC: 4 MMOL/L (ref 3.5–5.2)
PROT SERPL-MCNC: 6.7 G/DL (ref 6–8.5)
RBC # BLD AUTO: 3.7 X10E6/UL (ref 3.77–5.28)
SODIUM SERPL-SCNC: 137 MMOL/L (ref 134–144)
UNABLE TO VOID: NORMAL
WBC # BLD AUTO: 6.2 X10E3/UL (ref 3.4–10.8)

## 2024-11-07 NOTE — TELEPHONE ENCOUNTER
[facility] NEEDING ORDER FAXED OVER FOR A  [employer] SO THAT THEY CAN GET WITH [facility] ABOUT COMMUNITY RESOURCES.    FAX # IS [phone]

## 2024-11-08 ENCOUNTER — TELEPHONE (OUTPATIENT)
Dept: FAMILY MEDICINE CLINIC | Facility: CLINIC | Age: 84
End: 2024-11-08
Payer: MEDICARE

## 2024-11-08 ENCOUNTER — PATIENT OUTREACH (OUTPATIENT)
Dept: CASE MANAGEMENT | Facility: OTHER | Age: 84
End: 2024-11-08
Payer: MEDICARE
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
## 2024-11-09 ENCOUNTER — NURSE TRIAGE (OUTPATIENT)
Dept: CALL CENTER | Facility: HOSPITAL | Age: 84
End: 2024-11-09
Payer: MEDICARE

## 2024-11-09 NOTE — TELEPHONE ENCOUNTER
"Her Mom survived a TBI a few months - She is refusing to take her medications. She is has \" days where sometimes she is no cooperative.   The daughter is wanting to know what the medications are that are necessary for her Mom to take today.   Vitamin D 3    Cholesterol pill      Chewable ASA     Amlodipine - it is tablet  it can crushed. This one is necessary.    The behavior gets to where she is  harming  you or others. This would be an ER visit or call Police. She states she is not harming self or others. She is not wanting to cooperate and take medications.    Reason for Disposition   Caller has medicine question only, adult not sick, AND triager answers question    Additional Information   Negative: [1] Intentional drug overdose AND [2] suicidal thoughts or ideas   Negative: Drug overdose and triager unable to answer question   Negative: Caller requesting a renewal or refill of a medicine patient is currently taking   Negative: Caller requesting information unrelated to medicine   Negative: Caller requesting information about COVID-19 Vaccine   Negative: Caller requesting information about Emergency Contraception   Negative: Caller requesting information about Combined Birth Control Pills   Negative: Caller requesting information about Progestin Birth Control Pills   Negative: Caller requesting information about Post-Op pain or medicines   Negative: Caller requesting a prescription antibiotic (such as Penicillin) for Strep throat and has a positive culture result   Negative: Caller requesting a prescription anti-viral med (such as Tamiflu) and has influenza (flu) symptoms   Negative: Immunization reaction suspected   Negative: Rash while taking a medicine or within 3 days of stopping it   Negative: [1] Asthma and [2] having symptoms of asthma (cough, wheezing, etc.)   Negative: [1] Symptom of illness (e.g., headache, abdominal pain, earache, vomiting) AND [2] more than mild   Negative: Breastfeeding questions " about mother's medicines and diet   Negative: MORE THAN A DOUBLE DOSE of a prescription or over-the-counter (OTC) drug   Negative: [1] DOUBLE DOSE (an extra dose or lesser amount) of prescription drug AND [2] any symptoms (e.g., dizziness, nausea, pain, sleepiness)   Negative: [1] DOUBLE DOSE (an extra dose or lesser amount) of over-the-counter (OTC) drug AND [2] any symptoms (e.g., dizziness, nausea, pain, sleepiness)   Negative: Took another person's prescription drug   Negative: [1] DOUBLE DOSE (an extra dose or lesser amount) of prescription drug AND [2] NO symptoms  (Exception: A double dose of antibiotics.)   Negative: Diabetes drug error or overdose (e.g., took wrong type of insulin or took extra dose)   Negative: [1] Prescription not at pharmacy AND [2] was prescribed by PCP recently (Exception: Triager has access to EMR and prescription is recorded there. Go to Home Care and confirm for pharmacy.)   Negative: [1] Pharmacy calling with prescription question AND [2] triager unable to answer question   Negative: [1] Caller has URGENT medicine question about med that PCP or specialist prescribed AND [2] triager unable to answer question   Negative: Medicine patch causing local rash or itching   Negative: [1] Caller has medicine question about med NOT prescribed by PCP AND [2] triager unable to answer question (e.g., compatibility with other med, storage)   Negative: Prescription request for new medicine (not a refill)   Negative: [1] Caller has NON-URGENT medicine question about med that PCP prescribed AND [2] triager unable to answer question   Negative: Caller wants to use a complementary or alternative medicine   Negative: [1] Prescription prescribed recently is not at pharmacy AND [2] triager has access to patient's EMR AND [3] prescription is recorded in the EMR   Negative: [1] DOUBLE DOSE (an extra dose or lesser amount) of over-the-counter (OTC) drug AND [2] NO symptoms   Negative: [1] DOUBLE DOSE (an  "extra dose or lesser amount) of antibiotic drug AND [2] NO symptoms   Negative: Caller has medicine question, adult has minor symptoms, caller declines triage, AND triager answers question    Answer Assessment - Initial Assessment Questions  1. NAME of MEDICINE: \"What medicine(s) are you calling about?\"      See list   2. QUESTION: \"What is your question?\" (e.g., double dose of medicine, side effect)      What medications must be given.   3. PRESCRIBER: \"Who prescribed the medicine?\" Reason: if prescribed by specialist, call should be referred to that group.      PCP  4. SYMPTOMS: \"Do you have any symptoms?\" If Yes, ask: \"What symptoms are you having?\"  \"How bad are the symptoms (e.g., mild, moderate, severe)      none  5. PREGNANCY:  \"Is there any chance that you are pregnant?\" \"When was your last menstrual period?\"      n    Protocols used: Medication Question Call-ADULT-    "

## 2024-11-11 PROBLEM — F22 PARANOIA: Status: ACTIVE | Noted: 2024-11-11

## 2024-11-11 PROBLEM — R41.0 CONFUSION: Status: ACTIVE | Noted: 2024-11-11

## 2024-11-11 PROBLEM — E87.1 HYPONATREMIA: Status: ACTIVE | Noted: 2024-11-11

## 2024-11-11 NOTE — PATIENT INSTRUCTIONS
Hypertension: stable, continue amlodipine 5 mg 1 tab daily    Hyperlipidemia: continue atorvastatin 40 mg 1 tab daily, ordering fasting labs before next office visit    Hyponatremia: ordering cmp for further eval.    Vitamin D deficiency: continue daily supplement    Confusion/paranoia/agitation/cognitive communication deficit/restless and agitated: referring to neurology and case management

## 2024-11-12 DIAGNOSIS — E87.1 HYPONATREMIA: Primary | ICD-10-CM

## 2024-11-12 DIAGNOSIS — I10 PRIMARY HYPERTENSION: ICD-10-CM

## 2024-11-12 DIAGNOSIS — E78.2 MIXED HYPERLIPIDEMIA: ICD-10-CM

## 2024-11-13 LAB
ALBUMIN SERPL-MCNC: NORMAL G/DL
ALP SERPL-CCNC: NORMAL U/L
ALT SERPL-CCNC: NORMAL U/L
APPEARANCE UR: CLEAR
AST SERPL-CCNC: NORMAL U/L
BACTERIA #/AREA URNS HPF: ABNORMAL /HPF
BILIRUB DIRECT SERPL-MCNC: NORMAL MG/DL
BILIRUB SERPL-MCNC: NORMAL MG/DL
BILIRUB UR QL STRIP: NEGATIVE
CASTS URNS MICRO: ABNORMAL
COLOR UR: YELLOW
CRYSTALS URNS MICRO: ABNORMAL
EPI CELLS #/AREA URNS HPF: ABNORMAL /HPF
GLUCOSE UR QL STRIP: NEGATIVE
HGB UR QL STRIP: NEGATIVE
KETONES UR QL STRIP: NEGATIVE
LEUKOCYTE ESTERASE UR QL STRIP: ABNORMAL
LIPASE SERPL-CCNC: NORMAL U/L
NITRITE UR QL STRIP: NEGATIVE
PH UR STRIP: 6.5 [PH] (ref 5–8)
PROT SERPL-MCNC: NORMAL G/DL
PROT UR QL STRIP: NEGATIVE
RBC #/AREA URNS HPF: ABNORMAL /HPF
SP GR UR STRIP: 1.01 (ref 1–1.03)
SPECIMEN STATUS: NORMAL
UROBILINOGEN UR STRIP-MCNC: ABNORMAL MG/DL
WBC #/AREA URNS HPF: ABNORMAL /HPF
WRITTEN AUTHORIZATION: NORMAL

## 2024-11-15 ENCOUNTER — TELEPHONE (OUTPATIENT)
Dept: FAMILY MEDICINE CLINIC | Facility: CLINIC | Age: 84
End: 2024-11-15
Payer: MEDICARE

## 2024-11-15 DIAGNOSIS — E55.9 VITAMIN D DEFICIENCY, UNSPECIFIED: ICD-10-CM

## 2024-11-15 DIAGNOSIS — E78.2 MIXED HYPERLIPIDEMIA: Primary | ICD-10-CM

## 2024-11-15 DIAGNOSIS — R73.9 HYPERGLYCEMIA: ICD-10-CM

## 2024-11-15 NOTE — TELEPHONE ENCOUNTER
PT WILL BE GOING ON 12-13-24 TO THE [facility] IN [place] TO HAVE HER LABS DRAWN.     PLEASE PUT PT'S LAB ORDER IN.    THANKS

## 2024-11-21 ENCOUNTER — TELEPHONE (OUTPATIENT)
Dept: NEUROLOGY | Facility: CLINIC | Age: 84
End: 2024-11-21
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
## 2024-12-13 ENCOUNTER — LAB (OUTPATIENT)
Dept: LAB | Facility: HOSPITAL | Age: 84
End: 2024-12-13
Payer: MEDICARE

## 2024-12-13 ENCOUNTER — TELEPHONE (OUTPATIENT)
Dept: FAMILY MEDICINE CLINIC | Facility: CLINIC | Age: 84
End: 2024-12-13
Payer: MEDICARE

## 2024-12-13 DIAGNOSIS — I10 ESSENTIAL (PRIMARY) HYPERTENSION: ICD-10-CM

## 2024-12-13 DIAGNOSIS — F22 PARANOIA: ICD-10-CM

## 2024-12-13 DIAGNOSIS — R45.1 RESTLESSNESS AND AGITATION: ICD-10-CM

## 2024-12-13 DIAGNOSIS — R79.89 ABNORMAL TSH: Primary | ICD-10-CM

## 2024-12-13 LAB
25(OH)D3 SERPL-MCNC: 52.5 NG/ML (ref 30–100)
CHOLEST SERPL-MCNC: 118 MG/DL (ref 0–200)
HBA1C MFR BLD: 5.2 % (ref 4.8–5.6)
HDLC SERPL-MCNC: 52 MG/DL (ref 40–60)
LDLC SERPL CALC-MCNC: 51 MG/DL (ref 0–100)
LDLC/HDLC SERPL: 0.99 {RATIO}
T4 FREE SERPL-MCNC: 2.74 NG/DL (ref 0.92–1.68)
TRIGL SERPL-MCNC: 72 MG/DL (ref 0–150)
TSH SERPL DL<=0.05 MIU/L-ACNC: <0.005 UIU/ML (ref 0.27–4.2)
VLDLC SERPL-MCNC: 15 MG/DL (ref 5–40)

## 2024-12-13 PROCEDURE — 82306 VITAMIN D 25 HYDROXY: CPT | Performed by: NURSE PRACTITIONER

## 2024-12-13 PROCEDURE — 84439 ASSAY OF FREE THYROXINE: CPT | Performed by: NURSE PRACTITIONER

## 2024-12-13 PROCEDURE — 80061 LIPID PANEL: CPT | Performed by: NURSE PRACTITIONER

## 2024-12-13 PROCEDURE — 83036 HEMOGLOBIN GLYCOSYLATED A1C: CPT | Performed by: NURSE PRACTITIONER

## 2024-12-13 PROCEDURE — 84443 ASSAY THYROID STIM HORMONE: CPT | Performed by: NURSE PRACTITIONER

## 2024-12-13 NOTE — TELEPHONE ENCOUNTER
Called and spoke with Patient's daughter Elvira regarding abnormal thyroid profile. Patient's daughter stating only symptom Patient having is confusion (has history of). Patient's daughter informed that Patient needs to have thyroid labs re-drawn asap to evaluate lab error vs true results. Patient's daughter informed  of s/s to monitor for and if having any to take Patient to ER immediately. Patient's daughter verb understanding.

## 2024-12-16 ENCOUNTER — TELEPHONE (OUTPATIENT)
Dept: FAMILY MEDICINE CLINIC | Facility: CLINIC | Age: 84
End: 2024-12-16
Payer: MEDICARE

## 2024-12-16 DIAGNOSIS — E05.90 HYPERTHYROIDISM WITHOUT CRISIS: Primary | ICD-10-CM

## 2024-12-16 NOTE — TELEPHONE ENCOUNTER
Called and spoke with Patient's daughter Elvira. Informed of treatment plan of referral to endocrinologist urgently and referral for thyroid u/s asap.

## 2024-12-20 ENCOUNTER — OFFICE VISIT (OUTPATIENT)
Dept: FAMILY MEDICINE CLINIC | Facility: CLINIC | Age: 84
End: 2024-12-20
Payer: MEDICARE

## 2024-12-20 VITALS
WEIGHT: 115.1 LBS | DIASTOLIC BLOOD PRESSURE: 54 MMHG | SYSTOLIC BLOOD PRESSURE: 134 MMHG | HEIGHT: 62 IN | OXYGEN SATURATION: 98 % | BODY MASS INDEX: 21.18 KG/M2 | RESPIRATION RATE: 16 BRPM | TEMPERATURE: 98.7 F | HEART RATE: 88 BPM

## 2024-12-20 DIAGNOSIS — Z23 INFLUENZA VACCINE NEEDED: ICD-10-CM

## 2024-12-20 DIAGNOSIS — Z00.00 MEDICARE ANNUAL WELLNESS VISIT, SUBSEQUENT: Primary | ICD-10-CM

## 2024-12-20 DIAGNOSIS — Z91.81 AT HIGH RISK FOR FALLS: ICD-10-CM

## 2024-12-20 DIAGNOSIS — G47.00 INSOMNIA, UNSPECIFIED TYPE: ICD-10-CM

## 2024-12-20 DIAGNOSIS — E78.2 MIXED HYPERLIPIDEMIA: ICD-10-CM

## 2024-12-20 DIAGNOSIS — I10 ESSENTIAL (PRIMARY) HYPERTENSION: ICD-10-CM

## 2024-12-20 DIAGNOSIS — M54.50 ACUTE RIGHT-SIDED LOW BACK PAIN, UNSPECIFIED WHETHER SCIATICA PRESENT: ICD-10-CM

## 2024-12-20 DIAGNOSIS — R73.9 HYPERGLYCEMIA: ICD-10-CM

## 2024-12-20 PROBLEM — S02.109A: Status: ACTIVE | Noted: 2024-10-15

## 2024-12-20 PROBLEM — H91.90 HEARING LOSS: Status: ACTIVE | Noted: 2024-10-15

## 2024-12-20 PROCEDURE — 1125F AMNT PAIN NOTED PAIN PRSNT: CPT | Performed by: NURSE PRACTITIONER

## 2024-12-20 PROCEDURE — 1160F RVW MEDS BY RX/DR IN RCRD: CPT | Performed by: NURSE PRACTITIONER

## 2024-12-20 PROCEDURE — G0008 ADMIN INFLUENZA VIRUS VAC: HCPCS | Performed by: NURSE PRACTITIONER

## 2024-12-20 PROCEDURE — 3078F DIAST BP <80 MM HG: CPT | Performed by: NURSE PRACTITIONER

## 2024-12-20 PROCEDURE — G0439 PPPS, SUBSEQ VISIT: HCPCS | Performed by: NURSE PRACTITIONER

## 2024-12-20 PROCEDURE — 1159F MED LIST DOCD IN RCRD: CPT | Performed by: NURSE PRACTITIONER

## 2024-12-20 PROCEDURE — 99213 OFFICE O/P EST LOW 20 MIN: CPT | Performed by: NURSE PRACTITIONER

## 2024-12-20 PROCEDURE — 90662 IIV NO PRSV INCREASED AG IM: CPT | Performed by: NURSE PRACTITIONER

## 2024-12-20 PROCEDURE — 3075F SYST BP GE 130 - 139MM HG: CPT | Performed by: NURSE PRACTITIONER

## 2024-12-20 NOTE — ASSESSMENT & PLAN NOTE
Stable, continue atorvastatin 40 mg 1 tab daily; continue to eat a heart healthy diet and stay active.

## 2024-12-20 NOTE — PROGRESS NOTES
Subjective   The ABCs of the Annual Wellness Visit  Medicare Wellness Visit      Monica Colon is a 84 y.o. patient who presents for a Medicare Wellness Visit.    The following portions of the patient's history were reviewed and   updated as appropriate: allergies, current medications, past family history, past medical history, past social history, past surgical history, and problem list.    Compared to one year ago, the patient's physical   health is the same.  Compared to one year ago, the patient's mental   health is the same.    Recent Hospitalizations:  She was not admitted to the hospital during the last year.     Current Medical Providers:  Patient Care Team:  Nina Mejia APRN as PCP - General (Family Medicine)    Outpatient Medications Prior to Visit   Medication Sig Dispense Refill    amLODIPine (NORVASC) 5 MG tablet Take 1 tablet by mouth Daily. 90 tablet 1    aspirin 81 MG chewable tablet Chew 1 tablet Daily. 90 tablet 1    atorvastatin (LIPITOR) 40 MG tablet Take 1 tablet by mouth Daily. 90 tablet 1    vitamin D3 125 MCG (5000 UT) capsule capsule Take 1 capsule by mouth Daily. 90 capsule 1     No facility-administered medications prior to visit.     No opioid medication identified on active medication list. I have reviewed chart for other potential  high risk medication/s and harmful drug interactions in the elderly.      Aspirin is on active medication list. Aspirin use is indicated based on review of current medical condition/s. Pros and cons of this therapy have been discussed today. Benefits of this medication outweigh potential harm.  Patient has been encouraged to continue taking this medication.  .      Patient Active Problem List   Diagnosis    Hyperglycemia    Hyperlipidemia    Hypertension    Osteopenia    Systolic murmur    Vitamin D deficiency disease    Arthritis    Anemia, unspecified    Cognitive communication deficit    Dysphagia, unspecified    Essential (primary) hypertension     "Vitamin D deficiency, unspecified    History of falling    Benign recurrent abducens palsy of childhood    Hyperlipidemia, unspecified    Insomnia, unspecified    Non-ST elevation (NSTEMI) myocardial infarction    Restlessness and agitation    Rheumatoid arthritis, unspecified    Subarachnoid hemorrhage    Paranoia    Confusion    Hyponatremia    Fracture of base of skull, unspecified side, initial encounter for closed fracture    Hearing loss     Advance Care Planning Advance Directive is not on file.  ACP discussion was held with the patient during this visit. Patient has an advance directive (not in EMR), copy requested.            Objective   Vitals:    12/20/24 1427   BP: 134/54   BP Location: Right arm   Patient Position: Sitting   Cuff Size: Adult   Pulse: 88   Resp: 16   Temp: 98.7 °F (37.1 °C)   TempSrc: Temporal   SpO2: 98%   Weight: 52.2 kg (115 lb 1.6 oz)   Height: 157.5 cm (62.01\")   PainSc:   2   PainLoc: Back       Estimated body mass index is 21.05 kg/m² as calculated from the following:    Height as of this encounter: 157.5 cm (62.01\").    Weight as of this encounter: 52.2 kg (115 lb 1.6 oz).    BMI is within normal parameters. No other follow-up for BMI required.      Does the patient have evidence of cognitive impairment? Yes      Recent Results (from the past 2 weeks)   Lipid Panel With LDL / HDL Ratio    Collection Time: 12/13/24  8:32 AM    Specimen: Blood   Result Value Ref Range    Total Cholesterol 118 0 - 200 mg/dL    Triglycerides 72 0 - 150 mg/dL    HDL Cholesterol 52 40 - 60 mg/dL    LDL Cholesterol  51 0 - 100 mg/dL    VLDL Cholesterol 15 5 - 40 mg/dL    LDL/HDL Ratio 0.99    Hemoglobin A1c    Collection Time: 12/13/24  8:32 AM    Specimen: Blood   Result Value Ref Range    Hemoglobin A1C 5.20 4.80 - 5.60 %   Vitamin D,25-Hydroxy    Collection Time: 12/13/24  8:32 AM    Specimen: Blood   Result Value Ref Range    25 Hydroxy, Vitamin D 52.5 30.0 - 100.0 ng/ml   TSH Rfx On Abnormal To " Free T4    Collection Time: 12/13/24  8:32 AM    Specimen: Blood   Result Value Ref Range    TSH <0.005 (L) 0.270 - 4.200 uIU/mL   T4, Free    Collection Time: 12/13/24  8:32 AM    Specimen: Blood   Result Value Ref Range    Free T4 2.74 (H) 0.92 - 1.68 ng/dL   TSH    Collection Time: 12/14/24  9:54 AM    Specimen: Blood   Result Value Ref Range    TSH <0.005 (L) 0.450 - 4.500 uIU/mL   T4, Free    Collection Time: 12/14/24  9:54 AM    Specimen: Blood   Result Value Ref Range    Free T4 2.72 (H) 0.82 - 1.77 ng/dL   T3, Free    Collection Time: 12/14/24  9:54 AM    Specimen: Blood   Result Value Ref Range    T3, Free 8.9 (H) 2.0 - 4.4 pg/mL                                                                                                   Health  Risk Assessment    Smoking Status:  Social History     Tobacco Use   Smoking Status Never    Passive exposure: Never   Smokeless Tobacco Never     Alcohol Consumption:  Social History     Substance and Sexual Activity   Alcohol Use No       Fall Risk Screen  STEADI Fall Risk Assessment was completed, and patient is at HIGH risk for falls. Assessment completed on:12/20/2024    Depression Screening   Little interest or pleasure in doing things? Several days   Feeling down, depressed, or hopeless? Several days   PHQ-2 Total Score 2   Trouble falling or staying asleep, or sleeping too much? Several days   Feeling tired or having little energy? Several days   Poor appetite or overeating? Not at all   Feeling bad about yourself - or that you are a failure or have let yourself or your family down? Several days   Trouble concentrating on things, such as reading the newspaper or watching television? Several days   Moving or speaking so slowly that other people could have noticed? Or the opposite - being so fidgety or restless that you have been moving around a lot more than usual? Not at all   Thoughts that you would be better off dead, or of hurting yourself in some way? Not at all    PHQ-9 Total Score 6   If you checked off any problems, how difficult have these problems made it for you to do your work, take care of things at home, or get along with other people? Somewhat difficult      Health Habits and Functional and Cognitive Screenin/20/2024     2:21 PM   Functional & Cognitive Status   Do you have difficulty preparing food and eating? Yes   Do you have difficulty bathing yourself, getting dressed or grooming yourself? No   Do you have difficulty using the toilet? No   Do you have difficulty moving around from place to place? No   Do you have trouble with steps or getting out of a bed or a chair? Yes   Current Diet Well Balanced Diet   Dental Exam Up to date   Eye Exam Up to date   Exercise (times per week) 3 times per week   Current Exercises Include Walking   Do you need help using the phone?  No   Are you deaf or do you have serious difficulty hearing?  Yes   Do you need help to go to places out of walking distance? Yes   Do you need help shopping? Yes   Do you need help preparing meals?  Yes   Do you need help with housework?  Yes   Do you need help with laundry? Yes   Do you need help taking your medications? Yes   Do you need help managing money? Yes   Do you ever drive or ride in a car without wearing a seat belt? Yes   Have you felt unusual stress, anger or loneliness in the last month? No   Who do you live with? Child   If you need help, do you have trouble finding someone available to you? No   Have you been bothered in the last four weeks by sexual problems? No   Do you have difficulty concentrating, remembering or making decisions? No           Age-appropriate Screening Schedule:  Refer to the list below for future screening recommendations based on patient's age, sex and/or medical conditions. Orders for these recommended tests are listed in the plan section. The patient has been provided with a written plan.    Health Maintenance List  Health Maintenance   Topic  Date Due    RSV Vaccine - Adults (1 - 1-dose 75+ series) Never done    DXA SCAN  03/28/2019    COVID-19 Vaccine (6 - 2024-25 season) 09/01/2024    TDAP/TD VACCINES (2 - Td or Tdap) 09/08/2025    LIPID PANEL  12/13/2025    ANNUAL WELLNESS VISIT  12/20/2025    INFLUENZA VACCINE  Completed    Pneumococcal Vaccine 65+  Completed    ZOSTER VACCINE  Completed    MAMMOGRAM  Discontinued                                                                                                                                                CMS Preventative Services Quick Reference  Risk Factors Identified During Encounter  Fall Risk-High or Moderate: Discussed Fall Prevention in the home and Referral Placed for Physical Therapy  Immunizations Discussed/Encouraged: COVID19 and RSV (Respiratory Syncytial Virus)  Inactivity/Sedentary:  discussed activity level  Dental Screening Recommended  Vision Screening Recommended    The above risks/problems have been discussed with the patient.  Pertinent information has been shared with the patient in the After Visit Summary.  An After Visit Summary and PPPS were made available to the patient.    Follow Up:   Next Medicare Wellness visit to be scheduled in 1 year.         Additional E&M Note during same encounter follows:  Patient has additional, significant, and separately identifiable condition(s)/problem(s) that require work above and beyond the Medicare Wellness Visit     Chief Complaint  Medicare Wellness-subsequent and Results (Labs fup )    Subjective   HPI            Patient's daughter in office with her today, stating her family staying with her for care. Patient's daughter stating they are checking into caregivers (family friend and company called [employer]). Patient has thyroid u/s scheduled for 12-24-24 and appt with endocrinology on 12-26-24. Patient going to [facility] for physical therapy.     Objective   Vital Signs:  /54 (BP Location: Right arm, Patient  "Position: Sitting, Cuff Size: Adult)   Pulse 88   Temp 98.7 °F (37.1 °C) (Temporal)   Resp 16   Ht 157.5 cm (62.01\")   Wt 52.2 kg (115 lb 1.6 oz)   SpO2 98%   BMI 21.05 kg/m²   Physical Exam  Vitals reviewed.   HENT:      Head: Normocephalic.   Eyes:      Pupils: Pupils are equal, round, and reactive to light.   Cardiovascular:      Rate and Rhythm: Normal rate and regular rhythm.      Pulses: Normal pulses.   Pulmonary:      Effort: Pulmonary effort is normal.      Breath sounds: Normal breath sounds.   Musculoskeletal:         General: Normal range of motion.      Lumbar back: Tenderness present. No swelling, edema or bony tenderness. Normal range of motion.   Skin:     General: Skin is warm and dry.   Neurological:      Mental Status: She is alert.                       Assessment and Plan            Medicare annual wellness visit, subsequent         Essential (primary) hypertension  Borderline, continue amlodipine 5 mg 1 tab daily.         Mixed hyperlipidemia  Stable, continue atorvastatin 40 mg 1 tab daily; continue to eat a heart healthy diet and stay active.         Hyperglycemia  HgA1c 5.2, stable       Acute right-sided low back pain, unspecified whether sciatica present  Recommend discussing with physical therapy.  Recommend cold compress/ice pack 10-15 minutes at a time several times a day, warm compress/heating pad 10-15 minutes at at time several times a day, and over the counter biofreeze/lidocaine patches as needed (wash off from skin before using heating pad).       Insomnia, unspecified type  Continue to monitor       At high risk for falls         Influenza vaccine needed    Orders:    Fluzone High-Dose 65+yrs ([id number])            Follow Up   No follow-ups on file.  Patient was given instructions and counseling regarding her condition or for health maintenance advice. Please see specific information pulled into the AVS if appropriate.    "

## 2024-12-24 ENCOUNTER — HOSPITAL ENCOUNTER (OUTPATIENT)
Dept: ULTRASOUND IMAGING | Facility: HOSPITAL | Age: 84
Discharge: HOME | End: 2024-12-24
Admitting: NURSE PRACTITIONER
Payer: MEDICARE

## 2024-12-24 DIAGNOSIS — E05.90 HYPERTHYROIDISM WITHOUT CRISIS: ICD-10-CM

## 2024-12-24 PROCEDURE — 76536 US EXAM OF HEAD AND NECK: CPT

## 2024-12-26 ENCOUNTER — OFFICE VISIT (OUTPATIENT)
Dept: NEUROLOGY | Facility: CLINIC | Age: 84
End: 2024-12-26
Payer: COMMERCIAL

## 2024-12-26 ENCOUNTER — LAB (OUTPATIENT)
Facility: HOSPITAL | Age: 84
End: 2024-12-26
Payer: MEDICARE

## 2024-12-26 ENCOUNTER — PATIENT ROUNDING (BHMG ONLY) (OUTPATIENT)
Dept: NEUROLOGY | Facility: CLINIC | Age: 84
End: 2024-12-26
Payer: MEDICARE

## 2024-12-26 ENCOUNTER — OFFICE VISIT (OUTPATIENT)
Dept: ENDOCRINOLOGY | Age: 84
End: 2024-12-26
Payer: COMMERCIAL

## 2024-12-26 VITALS
HEART RATE: 85 BPM | OXYGEN SATURATION: 97 % | TEMPERATURE: 97.9 F | DIASTOLIC BLOOD PRESSURE: 74 MMHG | BODY MASS INDEX: 21.02 KG/M2 | HEIGHT: 62 IN | SYSTOLIC BLOOD PRESSURE: 122 MMHG | WEIGHT: 114.2 LBS

## 2024-12-26 VITALS
HEART RATE: 93 BPM | OXYGEN SATURATION: 98 % | HEIGHT: 62 IN | WEIGHT: 115 LBS | BODY MASS INDEX: 21.16 KG/M2 | DIASTOLIC BLOOD PRESSURE: 84 MMHG | SYSTOLIC BLOOD PRESSURE: 126 MMHG

## 2024-12-26 DIAGNOSIS — E05.90 HYPERTHYROIDISM: Primary | ICD-10-CM

## 2024-12-26 DIAGNOSIS — R41.840 DECREASED ATTENTION SPAN: ICD-10-CM

## 2024-12-26 DIAGNOSIS — Z87.820 HISTORY OF TRAUMATIC BRAIN INJURY: ICD-10-CM

## 2024-12-26 DIAGNOSIS — E04.1 THYROID NODULE: ICD-10-CM

## 2024-12-26 DIAGNOSIS — R45.4 IRRITABILITY: ICD-10-CM

## 2024-12-26 DIAGNOSIS — R41.3 MEMORY LOSS: Primary | ICD-10-CM

## 2024-12-26 LAB
ALBUMIN SERPL-MCNC: 3.9 G/DL (ref 3.5–5.2)
ALBUMIN/GLOB SERPL: 1.2 G/DL
ALP SERPL-CCNC: 159 U/L (ref 39–117)
ALT SERPL W P-5'-P-CCNC: 72 U/L (ref 1–33)
ANION GAP SERPL CALCULATED.3IONS-SCNC: 10 MMOL/L (ref 5–15)
AST SERPL-CCNC: 87 U/L (ref 1–32)
BILIRUB SERPL-MCNC: 0.5 MG/DL (ref 0–1.2)
BUN SERPL-MCNC: 10 MG/DL (ref 8–23)
BUN/CREAT SERPL: 14.9 (ref 7–25)
CALCIUM SPEC-SCNC: 10 MG/DL (ref 8.6–10.5)
CHLORIDE SERPL-SCNC: 104 MMOL/L (ref 98–107)
CO2 SERPL-SCNC: 27 MMOL/L (ref 22–29)
CREAT SERPL-MCNC: 0.67 MG/DL (ref 0.57–1)
EGFRCR SERPLBLD CKD-EPI 2021: 85.8 ML/MIN/1.73
GLOBULIN UR ELPH-MCNC: 3.3 GM/DL
GLUCOSE SERPL-MCNC: 131 MG/DL (ref 65–99)
POTASSIUM SERPL-SCNC: 4 MMOL/L (ref 3.5–5.2)
PROT SERPL-MCNC: 7.2 G/DL (ref 6–8.5)
SODIUM SERPL-SCNC: 141 MMOL/L (ref 136–145)
T3FREE SERPL-MCNC: 10.3 PG/ML (ref 2–4.4)
T4 FREE SERPL-MCNC: 2.97 NG/DL (ref 0.92–1.68)
TSH SERPL DL<=0.05 MIU/L-ACNC: <0.005 UIU/ML (ref 0.27–4.2)

## 2024-12-26 PROCEDURE — 36415 COLL VENOUS BLD VENIPUNCTURE: CPT | Performed by: STUDENT IN AN ORGANIZED HEALTH CARE EDUCATION/TRAINING PROGRAM

## 2024-12-26 PROCEDURE — 84445 ASSAY OF TSI GLOBULIN: CPT | Performed by: STUDENT IN AN ORGANIZED HEALTH CARE EDUCATION/TRAINING PROGRAM

## 2024-12-26 PROCEDURE — 83520 IMMUNOASSAY QUANT NOS NONAB: CPT | Performed by: STUDENT IN AN ORGANIZED HEALTH CARE EDUCATION/TRAINING PROGRAM

## 2024-12-26 PROCEDURE — 84443 ASSAY THYROID STIM HORMONE: CPT | Performed by: STUDENT IN AN ORGANIZED HEALTH CARE EDUCATION/TRAINING PROGRAM

## 2024-12-26 PROCEDURE — 84481 FREE ASSAY (FT-3): CPT | Performed by: STUDENT IN AN ORGANIZED HEALTH CARE EDUCATION/TRAINING PROGRAM

## 2024-12-26 PROCEDURE — 84439 ASSAY OF FREE THYROXINE: CPT | Performed by: STUDENT IN AN ORGANIZED HEALTH CARE EDUCATION/TRAINING PROGRAM

## 2024-12-26 PROCEDURE — 80053 COMPREHEN METABOLIC PANEL: CPT | Performed by: STUDENT IN AN ORGANIZED HEALTH CARE EDUCATION/TRAINING PROGRAM

## 2024-12-26 NOTE — ASSESSMENT & PLAN NOTE
Discussed hyperthyroidism in detail     Thyroid hormone stimulates and speeds up your metabolism and speeds up everything in your body.     Some causes of hyperthyroidism include:   1. Graves' disease (the eyes might bulge with this).   2. A nodule or nodules on the thyroid.   3. Temporary thyroid disease (transient thyroiditis).   4. Some medications (such as amiodarone).   5. Excess iodine intake (supplements containing kelp or seaweed might cause this).  Will repeat TFT, check TSI and thyroid receptor antibody  Thyroid uptake and scan ordered  Further testing and management based on results
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
## 2024-12-27 ENCOUNTER — PATIENT ROUNDING (BHMG ONLY) (OUTPATIENT)
Dept: ENDOCRINOLOGY | Age: 84
End: 2024-12-27
Payer: MEDICARE

## 2024-12-27 ENCOUNTER — TELEPHONE (OUTPATIENT)
Dept: ENDOCRINOLOGY | Age: 84
End: 2024-12-27
Payer: MEDICARE

## 2024-12-27 LAB — TSH RECEP AB SER-ACNC: 24.9 IU/L (ref 0–1.75)

## 2024-12-27 NOTE — TELEPHONE ENCOUNTER
Called and discussed the result with Elvira her daughter  TSH is suppressed and free hormones are elevated  Thyroid uptake and scan stat ordered, once antibodies and thyroid uptake results are in, will start the appropriate treatment  AST, ALT and alkaline phosphatase elevated, no recent viral infections.  Methimazole and PTU are the current treatment for hyperthyroidism.  It is known that both medications, as well as hyperthyroidism itself, can affect liver functions.  If the increase in liver function tests are due to the hyperthyroidism, treating the hyperthyroidism will cause the liver function test to return to normal.   If antithyroid medication cause worsening of liver functions ,  the medication needs to be stopped to avoid damage to the liver and options of radioactive iodine therapy or surgery need to be considered to treat the hyperthyroidism.  Forward the result to PCP

## 2024-12-28 LAB — TSI SER-ACNC: 28.7 IU/L (ref 0–0.55)

## 2024-12-30 ENCOUNTER — TELEPHONE (OUTPATIENT)
Dept: NEUROLOGY | Facility: CLINIC | Age: 84
End: 2024-12-30

## 2024-12-30 NOTE — TELEPHONE ENCOUNTER
Called and informed Elvira that Azucena is out of the office until Thursday but I would send the message to her. Elvira voiced understanding and thanked us for the communication.

## 2024-12-30 NOTE — TELEPHONE ENCOUNTER
PATIENTS DAUGHTER CALLING TO SPEAK WITH PROVIDER.  SHE STATES AT LAST OV, A COUNSELOR WAS SUGGESTED.    AMY SAYS PT IS RECEPTIVE TO THAT.    PLEASE CALL AMY AND DISCUSS    THANK YOU

## 2025-01-02 DIAGNOSIS — R45.4 IRRITABILITY: ICD-10-CM

## 2025-01-02 DIAGNOSIS — Z87.820 HISTORY OF TRAUMATIC BRAIN INJURY: ICD-10-CM

## 2025-01-02 DIAGNOSIS — F41.9 ANXIETY: ICD-10-CM

## 2025-01-02 DIAGNOSIS — R41.840 DECREASED ATTENTION SPAN: Primary | ICD-10-CM

## 2025-01-02 DIAGNOSIS — R41.3 MEMORY LOSS: ICD-10-CM

## 2025-01-02 NOTE — TELEPHONE ENCOUNTER
Spoke with Elvira patient's daughter and POA.  We are going to go forward with the behavioral health referral as she is interested in talking with a potential counselor about a lot of the changes occurring.  Referral placed for [facility] behavioral health

## 2025-01-07 NOTE — TELEPHONE ENCOUNTER
----- Message from Mahrokh Nokhbehzaeim sent at 12/27/2024  9:02 AM EST -----    ----- Message -----  From: Nokhbehzaeim, Mahrokh, MD  Sent: 12/26/2024   3:19 PM EST  To: Malaika Alford  I just wanted to let you know that I ordered a stat thyroid uptake and scan.  Thanks  Dr. SALCEDO   Name: Luther Vaughan      : 1959      MRN: [id number]  Encounter Provider: Oliver Hirsch MD  Encounter Date: 2025   Encounter department: [facility] SLEEP MEDICINE [facility]  :  Assessment & Plan  Obstructive sleep apnea  Obstructive Sleep Apnea - Discussed pathophysiology of MARLA, consequences of untreated MARLA and treatment options including PAP therapy, mandibular advancement device, positional therapy, and surgical referral. - Discussed risks of sleepy driving and mitigation strategies (napping). Patient agrees to not drive if tired or sleepy. - Advised avoidance of alcohol and centrally acting medications as these can worsen MARLA.  -Patient presents for initial consultation with history of sleep disordered breathing and obstructive sleep apnea.  He reports initially being diagnosed approximately 25 years ago.  He has been continued on CPAP therapy ever since.  He is currently on a fixed CPAP pressure of 10 cm of water.  He has a residual AHI of 2.6 events per hour, he does not have significant leak, he is using an over the nose nasal mask.  -I have placed an order today in the office for CPAP resupply.  - I have asked the patient to return to the office in approximately 1 year for CPAP compliance check and follow-up.  Patient verbalized understanding and stated he would do so.  Orders:    Ambulatory Referral to Sleep Medicine    PAP DME Resupply/Reorder    Atrial fibrillation with RVR (HCC)  Patient with reported history of permanent atrial fibrillation.  He is continued on Eliquis for stroke prevention as well as Lopressor for rate control.  He follows up regularly with cardiology.  We discussed and reviewed the impact of increased cardiac comorbidity as well as increased arrhythmia risk with untreated sleep disordered breathing.  Patient verbalized understanding and will continue to use CPAP therapy with all periods of sleep including naps.  Orders:    Ambulatory Referral to Sleep  Medicine    PAP DME Resupply/Reorder    Acute diastolic CHF (congestive heart failure) (HCC)  Wt Readings from Last 3 Encounters:   01/08/25 (!) 137 kg (303 lb)   10/15/24 (!) 137 kg (301 lb)   08/28/24 134 kg (296 lb)   He is continued on goal-directed medical therapy.  He follows regularly with cardiology.  I have reviewed the most recent results from his transthoracic echocardiogram.  As above, I have encouraged him to continue CPAP therapy with all periods of sleep including naps.            Orders:    Ambulatory Referral to Sleep Medicine    PAP DME Resupply/Reorder    Primary hypertension  Hypertension - We reviewed the association between untreated obstructive sleep apnea and the increased risk for hypertension. Patient to continue on prescribed anti-hypertensive therapy and follow up with PCP for continuity of care.     Orders:    PAP DME Resupply/Reorder    Type 2 diabetes mellitus without complication, without long-term current use of insulin (HCC)  Patient with a history of type 2 diabetes.  Most recent A1c of 6.3 on 8/14/2024.  I have encouraged continue primary care follow-up.  We reviewed the impact of sleep on glucose control and insulin sensitivity.  I have encouraged the patient to continue to aim for a goal of 7 to 9 hours of sleep nightly for overall health.  Lab Results   Component Value Date    HGBA1C 6.3 (H) 08/14/2024       Orders:    PAP DME Resupply/Reorder    Morbid obesity (HCC)  Obesity - We reviewed the association of obesity on obstructive sleep apnea and sleep disordered breathing. Encouraged patient to follow healthy diet, regular exercise regimen, and pursue weight loss to manage symptoms.   -I reviewed GLP-1 agents for weight loss as well as cardiac indication and treatment of sleep disordered breathing with the patient today in the office.  I have encouraged him to consider this therapy as he finishes his rehab from his right hip and right knee replacement.  We discussed this  "together in detail today in the office.  Orders:    PAP DME Resupply/Reorder        History of Present Illness     Pertinent positives/negatives included in HPI and also as noted:     Objective   /82   Pulse 71   Ht 5' 8.5\" (1.74 m)   Wt (!) 137 kg (303 lb)   SpO2 98%   BMI 45.40 kg/m²     Neck Circumference: 8.5  [facility] New Patient Evaluation    Mr. Vaughan is a 65 y.o. male with a PMH of hypertension, hyperlipidemia, type 2 diabetes mellitus, paroxysmal atrial fibrillation, morbid obesity (BMI 45), who presents as a new patient for evaluation of sleep disordered breathing.     I have reviewed the 10/15/2024 family medicine office note with Maycol Arango DO.    I have reviewed the 8/28/2024 cardiology office note with Akosua Maher MD.    History of Presenting Illness:    The patient denied snoring. There are no choking and gasping episodes. There are no witnessed apneas. Rare episode(s) of nocturia occur per night. The patient sleeps on his left side and back. He sleeps with one pillow. There are no reports of nocturnal behaviors.    The patient's [place] sleepiness scale score is 1/24 (<=10 is normal). He has not been sleepy while driving. He has not had a fall-asleep motor vehicle accident. There are no reports of hypnagogic hallucinations, cataplexy or sleep paralysis.    In terms of the patient's sleep/wake cycle symptoms:  Bedtime: 9:30pm   SOL: 30 Minutes   Nocturnal awakenings: 1-2x \"varies\"  Wakeup time: 5:30am   Naps: Denied    Total sleep time estimate: Approximately 8-9 hours.     Weekends are approximately similar to week days.    He has tried Tylenol PM for poor sleep in the past.    His legs occasionally do bother him on trying to initiate sleep.    Diagnosed approximately 25 years ago with obstructive sleep apnea.  He reports that he has been on therapy since that time.    Using distilled water in machine. Knows how to change filters. He reports that he needs " "supplies.     CPAP Compliance  DME: [employer].  Machine Type and Settings: AutoCPAP 10 cmH2O   Mask Type: Over the Nose Nasal Mask  Compliance Reviewed (12/8/2024-1/6/2025)  100% Usage (30 out of 30 days).   Used > 4 hours a Night For 100% of nights.  AutoCPAP Median Pressure - 10 cmH2O  Average time in large leak per day 0 seconds  Residual AHI 2.6 events/hour      Past Medical History:   Diagnosis Date    Chronic ulcer of lower extremity (HCC) 01/05/2024    Diabetes mellitus (HCC)     Hypertension     Incarcerated umbilical hernia 01/05/2022    Migraine     Obesity     MARLA on CPAP     Sleep apnea     Vertigo     episode 1/30/2016        Past Surgical History:   Procedure Laterality Date    HAND SURGERY      football injury    HERNIA REPAIR      KNEE SURGERY Left     left meniscus     MENISCECTOMY Left       No prior upper airway surgeries.     Allergies   Allergen Reactions    Erythromycin Diarrhea    Fish-Derived Products - Food Allergy Diarrhea     States \"cartilage fish\" cause sweating, diarrhea, vomiting        Current Outpatient Medications on File Prior to Visit   Medication Sig Dispense Refill    Ascorbic Acid, Vitamin C, (VITAMIN C) 100 MG tablet Take by mouth daily      atorvastatin (LIPITOR) 10 mg tablet TAKE 1 TABLET BY MOUTH EVERY DAY WITH DINNER 90 tablet 1    cholecalciferol (VITAMIN D3) 400 units tablet Take 400 Units by mouth daily      losartan (COZAAR) 50 mg tablet TAKE 1 TABLET BY MOUTH EVERY DAY 90 tablet 1    metoprolol tartrate (LOPRESSOR) 50 mg tablet TAKE 1 TABLET BY MOUTH EVERY 12 HOURS 180 tablet 1    torsemide (DEMADEX) 10 mg tablet TAKE 1 TABLET (10 MG TOTAL) BY MOUTH DAILY. DO NOT START BEFORE JANUARY 9, 2024. 90 tablet 1    traMADol (Ultram) 50 mg tablet Take 1 tablet (50 mg total) by mouth every 8 (eight) hours as needed for moderate pain 30 tablet 0    apixaban (ELIQUIS) 5 mg Take 1 tablet (5 mg total) by mouth 2 (two) times a day 180 tablet 1    meloxicam (MOBIC) 15 mg tablet 15 mg " "daily States does not take daily. (Patient not taking: Reported on 1/8/2025)       No current facility-administered medications on file prior to visit.     Family History: His family history includes Heart attack in his father; Heart disease in his father; No Known Problems in his mother.    Social History:   Job:  - Retired Consulting   Caffeine: 1 16oz Cup of Coffee Daily  Alcohol: Denied  Drugs: Denied  Tobacco: Denied  Vape: Denied      Patient's medications, allergies, past medical, surgical, social and family histories were reviewed in the electronic medical record and updated as appropriate.    Review of Systems: On review of systems, the patient reports no current chest pain or active dyspnea.    Vitals:    01/08/25 0917   BP: 136/82   Pulse: 71   SpO2: 98%       Physical Examination:  Gen: No acute distress, not visibly anxious, speaking comfortably  H&N: MM IV; no retro/micrognathia; macroglossia; no visible thyromegaly  Neuro: Alert and oriented x3, interactive  Psych: Pleasant, normal affect  Skin: No visible rashes  Respiratory: No accessory muscle usage, breathing comfortably  Cardiac: 1+ visible edema of extremities  Abdomen: Soft, nontender, distended  Musculoskeletal: Normal ROM Intact of Extremities    Study Results:  I reviewed the following labs:    No results found for: \"FERRITIN\"    Lab Results   Component Value Date    WBC 8.63 09/24/2024    HGB 12.0 (L) 10/22/2024    HCT 36.2 (L) 10/22/2024    MCV 93 09/24/2024     09/24/2024       This SmartLink has not been configured with any valid records.       Lab Results   Component Value Date    SODIUM 138 10/22/2024    K 3.8 10/22/2024     10/22/2024    CO2 27 10/22/2024    BUN 17 10/22/2024    CREATININE 0.84 10/22/2024    GLUC 126 (H) 10/22/2024    CALCIUM 8.6 10/22/2024       This SmartLink has not been configured with any valid records.       Lab Results   Component Value Date    UAS6QMAAQEIV 2.657 06/20/2023       "    Results/Data:  I have reviewed the results and report from the 1/5/2024 transthoracic echocardiogram: Left Ventricle: Left ventricular cavity size is normal. Wall thickness is moderately increased. There is mild to moderate concentric hypertrophy. The left ventricular ejection fraction is around 55% by visual estimation. Systolic function is normal. Hard to assess accurately EF due to tachycardia and atrial fibrillation. Although no diagnostic regional wall motion abnormality was identified, this possibility cannot be completely excluded on the basis of this study.     I have reviewed the results and report from the 1/5/2024 chest x-ray    I answered the patient's questions to the best of my ability. We will continue with longitudinal follow-up for evaluation of sleep disordered breathing. Follow-up will be after sleep testing is completed.    Oliver Hirsch MD  Sleep Medicine and Internal Medicine  [facility]  01/08/25

## 2025-01-09 ENCOUNTER — HOSPITAL ENCOUNTER (OUTPATIENT)
Dept: NUCLEAR MEDICINE | Facility: HOSPITAL | Age: 85
Discharge: HOME | End: 2025-01-09
Payer: MEDICARE

## 2025-01-09 PROCEDURE — A9516 IODINE I-123 SOD IODIDE MIC: HCPCS | Performed by: STUDENT IN AN ORGANIZED HEALTH CARE EDUCATION/TRAINING PROGRAM

## 2025-01-09 PROCEDURE — 34310000005 SODIUM IODIDE 3.7 MBQ CAPSULE: Performed by: STUDENT IN AN ORGANIZED HEALTH CARE EDUCATION/TRAINING PROGRAM

## 2025-01-09 PROCEDURE — 78014 THYROID IMAGING W/BLOOD FLOW: CPT

## 2025-01-09 RX ORDER — SODIUM IODIDE I 123 100 UCI/1
1 CAPSULE, GELATIN COATED ORAL
Status: COMPLETED | OUTPATIENT
Start: 2025-01-09 | End: 2025-01-09

## 2025-01-09 RX ADMIN — SODIUM IODIDE I 123 1 CAPSULE: 100 CAPSULE, GELATIN COATED ORAL at 13:15

## 2025-01-10 ENCOUNTER — HOSPITAL ENCOUNTER (OUTPATIENT)
Dept: NUCLEAR MEDICINE | Facility: HOSPITAL | Age: 85
Discharge: HOME | End: 2025-01-10
Payer: MEDICARE

## 2025-01-14 ENCOUNTER — TELEPHONE (OUTPATIENT)
Dept: ENDOCRINOLOGY | Age: 85
End: 2025-01-14
Payer: MEDICARE

## 2025-01-14 NOTE — TELEPHONE ENCOUNTER
Called and discussed the thyroid uptake and scan with Elvira, her daughter  Result showed increased 24-hour radioactive iodine uptake measuring 60% consistent with Graves' disease and Heterogenous thyroid gland uptake with diminished uptake left lower  pole where 1 cm nodule is demonstrated sonographically, potential small  cold nodule.  Discussed the treatment options including methimazole.  Elvira states that her mother is concerned about the starting the medication for Graves' disease.  Elvira would like me to schedule a follow-up visit to discuss the potential consequences of leaving Graves' disease untreated.  Graves' disease can lead to various complication such as arrhythmia and gradual bone loss if left untreated.  Will schedule a follow-up visit within the next 2 weeks

## 2025-01-30 ENCOUNTER — LAB (OUTPATIENT)
Facility: HOSPITAL | Age: 85
End: 2025-01-30
Payer: MEDICARE

## 2025-01-30 ENCOUNTER — OFFICE VISIT (OUTPATIENT)
Dept: ENDOCRINOLOGY | Age: 85
End: 2025-01-30
Payer: MEDICARE

## 2025-01-30 VITALS
SYSTOLIC BLOOD PRESSURE: 132 MMHG | HEART RATE: 84 BPM | TEMPERATURE: 97.7 F | BODY MASS INDEX: 19.89 KG/M2 | OXYGEN SATURATION: 100 % | DIASTOLIC BLOOD PRESSURE: 60 MMHG | WEIGHT: 108.8 LBS

## 2025-01-30 DIAGNOSIS — E05.00 GRAVES' DISEASE: ICD-10-CM

## 2025-01-30 DIAGNOSIS — E05.00 GRAVES' DISEASE: Primary | ICD-10-CM

## 2025-01-30 DIAGNOSIS — E04.1 THYROID NODULE: ICD-10-CM

## 2025-01-30 PROCEDURE — 3078F DIAST BP <80 MM HG: CPT | Performed by: STUDENT IN AN ORGANIZED HEALTH CARE EDUCATION/TRAINING PROGRAM

## 2025-01-30 PROCEDURE — 36415 COLL VENOUS BLD VENIPUNCTURE: CPT

## 2025-01-30 PROCEDURE — 3075F SYST BP GE 130 - 139MM HG: CPT | Performed by: STUDENT IN AN ORGANIZED HEALTH CARE EDUCATION/TRAINING PROGRAM

## 2025-01-30 PROCEDURE — 84439 ASSAY OF FREE THYROXINE: CPT | Performed by: STUDENT IN AN ORGANIZED HEALTH CARE EDUCATION/TRAINING PROGRAM

## 2025-01-30 PROCEDURE — 80053 COMPREHEN METABOLIC PANEL: CPT | Performed by: STUDENT IN AN ORGANIZED HEALTH CARE EDUCATION/TRAINING PROGRAM

## 2025-01-30 PROCEDURE — 85027 COMPLETE CBC AUTOMATED: CPT

## 2025-01-30 PROCEDURE — 99214 OFFICE O/P EST MOD 30 MIN: CPT | Performed by: STUDENT IN AN ORGANIZED HEALTH CARE EDUCATION/TRAINING PROGRAM

## 2025-01-30 PROCEDURE — G2211 COMPLEX E/M VISIT ADD ON: HCPCS | Performed by: STUDENT IN AN ORGANIZED HEALTH CARE EDUCATION/TRAINING PROGRAM

## 2025-01-30 PROCEDURE — 84481 FREE ASSAY (FT-3): CPT | Performed by: STUDENT IN AN ORGANIZED HEALTH CARE EDUCATION/TRAINING PROGRAM

## 2025-01-30 PROCEDURE — 84443 ASSAY THYROID STIM HORMONE: CPT | Performed by: STUDENT IN AN ORGANIZED HEALTH CARE EDUCATION/TRAINING PROGRAM

## 2025-01-30 RX ORDER — METHIMAZOLE 5 MG/1
5 TABLET ORAL DAILY
Qty: 90 TABLET | Refills: 3 | Status: SHIPPED | OUTPATIENT
Start: 2025-01-30 | End: 2026-01-30

## 2025-01-30 NOTE — ASSESSMENT & PLAN NOTE
Patient is reluctant to start the treatment  Had a lengthy conversation with patient and her daughter regarding the importance of treatment of Graves' disease  Untreated Graves' disease can lead to various complication such as arrhythmia, gradual bone loss and weight loss recheck TFT, CMP and CBC today  Start methimazole 5 mg daily and repeat labs in 2 weeks,  Side effect of medications discussed in detail including liver abnormalities, noted on blood test, have to monitor liver blood test at least 3 times yearly generally. If the liver is becomes irritated, stopping the Methimazole will cause improvement in the liver blood test as long as this is caught early, getting the blood test is very important. Rash, itchiness, rarely people might get bone/joint aches, and rarely it can lower your white blood cell count, making an infection occur call us if fever or chills or sore throat that does not resolve in a few days, so we can get a CBC blood test to see if the Methimazole is the cause   if liver function is not improving we will consider radioactive iodine ablation or surgery  She will have tooth extraction and bone graft next week, may need steroid prior to procedure to prevent thyroid storm if general anesthesia is required
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
## 2025-01-31 DIAGNOSIS — E05.00 GRAVES' DISEASE: Primary | ICD-10-CM

## 2025-01-31 LAB
ALBUMIN SERPL-MCNC: 3.7 G/DL (ref 3.5–5.2)
ALBUMIN/GLOB SERPL: 1.2 G/DL
ALP SERPL-CCNC: 125 U/L (ref 39–117)
ALT SERPL W P-5'-P-CCNC: 46 U/L (ref 1–33)
ANION GAP SERPL CALCULATED.3IONS-SCNC: 9 MMOL/L (ref 5–15)
AST SERPL-CCNC: 56 U/L (ref 1–32)
BILIRUB SERPL-MCNC: 0.5 MG/DL (ref 0–1.2)
BUN SERPL-MCNC: 14 MG/DL (ref 8–23)
BUN/CREAT SERPL: 20.9 (ref 7–25)
CALCIUM SPEC-SCNC: 9.8 MG/DL (ref 8.6–10.5)
CHLORIDE SERPL-SCNC: 108 MMOL/L (ref 98–107)
CO2 SERPL-SCNC: 25 MMOL/L (ref 22–29)
CREAT SERPL-MCNC: 0.67 MG/DL (ref 0.57–1)
DEPRECATED RDW RBC AUTO: 39.6 FL (ref 37–54)
EGFRCR SERPLBLD CKD-EPI 2021: 85.8 ML/MIN/1.73
ERYTHROCYTE [DISTWIDTH] IN BLOOD BY AUTOMATED COUNT: 12.6 % (ref 12.3–15.4)
GLOBULIN UR ELPH-MCNC: 3.2 GM/DL
GLUCOSE SERPL-MCNC: 87 MG/DL (ref 65–99)
HCT VFR BLD AUTO: 33.2 % (ref 34–46.6)
HGB BLD-MCNC: 11 G/DL (ref 12–15.9)
MCH RBC QN AUTO: 28.8 PG (ref 26.6–33)
MCHC RBC AUTO-ENTMCNC: 33.1 G/DL (ref 31.5–35.7)
MCV RBC AUTO: 86.9 FL (ref 79–97)
PLATELET # BLD AUTO: 241 10*3/MM3 (ref 140–450)
PMV BLD AUTO: 10.1 FL (ref 6–12)
POTASSIUM SERPL-SCNC: 4.6 MMOL/L (ref 3.5–5.2)
PROT SERPL-MCNC: 6.9 G/DL (ref 6–8.5)
RBC # BLD AUTO: 3.82 10*6/MM3 (ref 3.77–5.28)
SODIUM SERPL-SCNC: 142 MMOL/L (ref 136–145)
T3FREE SERPL-MCNC: 8.63 PG/ML (ref 2–4.4)
T4 FREE SERPL-MCNC: 2.46 NG/DL (ref 0.92–1.68)
TSH SERPL DL<=0.05 MIU/L-ACNC: <0.005 UIU/ML (ref 0.27–4.2)
WBC NRBC COR # BLD AUTO: 4.97 10*3/MM3 (ref 3.4–10.8)

## 2025-02-18 ENCOUNTER — TELEPHONE (OUTPATIENT)
Dept: ENDOCRINOLOGY | Age: 85
End: 2025-02-18
Payer: MEDICARE

## 2025-02-18 ENCOUNTER — LAB (OUTPATIENT)
Facility: HOSPITAL | Age: 85
End: 2025-02-18
Payer: MEDICARE

## 2025-02-18 ENCOUNTER — PATIENT MESSAGE (OUTPATIENT)
Dept: NEUROLOGY | Facility: CLINIC | Age: 85
End: 2025-02-18
Payer: MEDICARE

## 2025-02-18 DIAGNOSIS — E05.00 GRAVES' DISEASE: ICD-10-CM

## 2025-02-18 DIAGNOSIS — E05.00 GRAVES' DISEASE: Primary | ICD-10-CM

## 2025-02-18 LAB
ALBUMIN SERPL-MCNC: 3.8 G/DL (ref 3.5–5.2)
ALBUMIN/GLOB SERPL: 1 G/DL
ALP SERPL-CCNC: 155 U/L (ref 39–117)
ALT SERPL W P-5'-P-CCNC: 23 U/L (ref 1–33)
ANION GAP SERPL CALCULATED.3IONS-SCNC: 8.6 MMOL/L (ref 5–15)
AST SERPL-CCNC: 28 U/L (ref 1–32)
BASOPHILS # BLD AUTO: 0.03 10*3/MM3 (ref 0–0.2)
BASOPHILS NFR BLD AUTO: 0.6 % (ref 0–1.5)
BILIRUB SERPL-MCNC: 0.5 MG/DL (ref 0–1.2)
BUN SERPL-MCNC: 15 MG/DL (ref 8–23)
BUN/CREAT SERPL: 22.1 (ref 7–25)
CALCIUM SPEC-SCNC: 9.7 MG/DL (ref 8.6–10.5)
CHLORIDE SERPL-SCNC: 104 MMOL/L (ref 98–107)
CO2 SERPL-SCNC: 25.4 MMOL/L (ref 22–29)
CREAT SERPL-MCNC: 0.68 MG/DL (ref 0.57–1)
DEPRECATED RDW RBC AUTO: 39.4 FL (ref 37–54)
EGFRCR SERPLBLD CKD-EPI 2021: 85.5 ML/MIN/1.73
EOSINOPHIL # BLD AUTO: 0.16 10*3/MM3 (ref 0–0.4)
EOSINOPHIL NFR BLD AUTO: 3.1 % (ref 0.3–6.2)
ERYTHROCYTE [DISTWIDTH] IN BLOOD BY AUTOMATED COUNT: 12.6 % (ref 12.3–15.4)
GLOBULIN UR ELPH-MCNC: 3.7 GM/DL
GLUCOSE SERPL-MCNC: 95 MG/DL (ref 65–99)
HCT VFR BLD AUTO: 35.5 % (ref 34–46.6)
HGB BLD-MCNC: 11.7 G/DL (ref 12–15.9)
IMM GRANULOCYTES # BLD AUTO: 0.01 10*3/MM3 (ref 0–0.05)
IMM GRANULOCYTES NFR BLD AUTO: 0.2 % (ref 0–0.5)
LYMPHOCYTES # BLD AUTO: 1.14 10*3/MM3 (ref 0.7–3.1)
LYMPHOCYTES NFR BLD AUTO: 22.1 % (ref 19.6–45.3)
MCH RBC QN AUTO: 28.6 PG (ref 26.6–33)
MCHC RBC AUTO-ENTMCNC: 33 G/DL (ref 31.5–35.7)
MCV RBC AUTO: 86.8 FL (ref 79–97)
MONOCYTES # BLD AUTO: 0.52 10*3/MM3 (ref 0.1–0.9)
MONOCYTES NFR BLD AUTO: 10.1 % (ref 5–12)
NEUTROPHILS NFR BLD AUTO: 3.3 10*3/MM3 (ref 1.7–7)
NEUTROPHILS NFR BLD AUTO: 63.9 % (ref 42.7–76)
NRBC BLD AUTO-RTO: 0 /100 WBC (ref 0–0.2)
PLATELET # BLD AUTO: 293 10*3/MM3 (ref 140–450)
PMV BLD AUTO: 10 FL (ref 6–12)
POTASSIUM SERPL-SCNC: 4.5 MMOL/L (ref 3.5–5.2)
PROT SERPL-MCNC: 7.5 G/DL (ref 6–8.5)
RBC # BLD AUTO: 4.09 10*6/MM3 (ref 3.77–5.28)
SODIUM SERPL-SCNC: 138 MMOL/L (ref 136–145)
T3FREE SERPL-MCNC: 3.23 PG/ML (ref 2–4.4)
T4 FREE SERPL-MCNC: 0.98 NG/DL (ref 0.92–1.68)
TSH SERPL DL<=0.05 MIU/L-ACNC: <0.005 UIU/ML (ref 0.27–4.2)
WBC NRBC COR # BLD AUTO: 5.16 10*3/MM3 (ref 3.4–10.8)

## 2025-02-18 PROCEDURE — 80053 COMPREHEN METABOLIC PANEL: CPT

## 2025-02-18 PROCEDURE — 84481 FREE ASSAY (FT-3): CPT

## 2025-02-18 PROCEDURE — 84443 ASSAY THYROID STIM HORMONE: CPT

## 2025-02-18 PROCEDURE — 36415 COLL VENOUS BLD VENIPUNCTURE: CPT

## 2025-02-18 PROCEDURE — 84439 ASSAY OF FREE THYROXINE: CPT

## 2025-02-18 PROCEDURE — 85025 COMPLETE CBC W/AUTO DIFF WBC: CPT

## 2025-02-18 NOTE — TELEPHONE ENCOUNTER
Called and discussed the results  Free T4 and free T3 are within the normal range, TSH is suppressed  TSH is lagging behind free hormones to be normal  AST and ALT improved  Alkaline phosphatase is elevated  Will continue the current dose of methimazole and repeat TFT and CMP before next visit in March

## 2025-02-21 NOTE — TELEPHONE ENCOUNTER
Left message for Elvira the patients daughter to return our call to see if her Neuropysch testing has been completed at [facility]. If not her appointment with [facility] on 02/25/2025 will need to be rescheduled.

## 2025-02-21 NOTE — TELEPHONE ENCOUNTER
Elvira the patients daughter called back, she stated that her mother is not scheduled until mid September for neuropysch testing. She is very adamant that she wants to keep her appointment for 02/25/2025.

## 2025-02-25 ENCOUNTER — OFFICE VISIT (OUTPATIENT)
Dept: NEUROLOGY | Facility: CLINIC | Age: 85
End: 2025-02-25
Payer: MEDICARE

## 2025-02-25 VITALS
WEIGHT: 111 LBS | HEART RATE: 63 BPM | HEIGHT: 62 IN | DIASTOLIC BLOOD PRESSURE: 62 MMHG | SYSTOLIC BLOOD PRESSURE: 124 MMHG | BODY MASS INDEX: 20.43 KG/M2 | OXYGEN SATURATION: 100 %

## 2025-02-25 DIAGNOSIS — Z87.820 HISTORY OF TRAUMATIC BRAIN INJURY: ICD-10-CM

## 2025-02-25 DIAGNOSIS — R41.3 MEMORY LOSS: Primary | ICD-10-CM

## 2025-02-25 DIAGNOSIS — R41.840 DECREASED ATTENTION SPAN: ICD-10-CM

## 2025-02-25 PROCEDURE — 99215 OFFICE O/P EST HI 40 MIN: CPT | Performed by: NURSE PRACTITIONER

## 2025-02-25 PROCEDURE — 3074F SYST BP LT 130 MM HG: CPT | Performed by: NURSE PRACTITIONER

## 2025-02-25 PROCEDURE — 3078F DIAST BP <80 MM HG: CPT | Performed by: NURSE PRACTITIONER
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
## 2025-03-18 ENCOUNTER — LAB (OUTPATIENT)
Facility: HOSPITAL | Age: 85
End: 2025-03-18
Payer: MEDICARE

## 2025-03-18 DIAGNOSIS — E05.00 GRAVES' DISEASE: ICD-10-CM

## 2025-03-18 LAB
ALBUMIN SERPL-MCNC: 4.1 G/DL (ref 3.5–5.2)
ALBUMIN/GLOB SERPL: 1.2 G/DL
ALP SERPL-CCNC: 229 U/L (ref 39–117)
ALT SERPL W P-5'-P-CCNC: 25 U/L (ref 1–33)
ANION GAP SERPL CALCULATED.3IONS-SCNC: 8.3 MMOL/L (ref 5–15)
AST SERPL-CCNC: 34 U/L (ref 1–32)
BILIRUB SERPL-MCNC: 0.5 MG/DL (ref 0–1.2)
BUN SERPL-MCNC: 15 MG/DL (ref 8–23)
BUN/CREAT SERPL: 19.2 (ref 7–25)
CALCIUM SPEC-SCNC: 9.8 MG/DL (ref 8.6–10.5)
CHLORIDE SERPL-SCNC: 102 MMOL/L (ref 98–107)
CO2 SERPL-SCNC: 26.7 MMOL/L (ref 22–29)
CREAT SERPL-MCNC: 0.78 MG/DL (ref 0.57–1)
EGFRCR SERPLBLD CKD-EPI 2021: 74.1 ML/MIN/1.73
GLOBULIN UR ELPH-MCNC: 3.5 GM/DL
GLUCOSE SERPL-MCNC: 100 MG/DL (ref 65–99)
POTASSIUM SERPL-SCNC: 4 MMOL/L (ref 3.5–5.2)
PROT SERPL-MCNC: 7.6 G/DL (ref 6–8.5)
SODIUM SERPL-SCNC: 137 MMOL/L (ref 136–145)

## 2025-03-18 PROCEDURE — 36415 COLL VENOUS BLD VENIPUNCTURE: CPT

## 2025-03-18 PROCEDURE — 84481 FREE ASSAY (FT-3): CPT | Performed by: STUDENT IN AN ORGANIZED HEALTH CARE EDUCATION/TRAINING PROGRAM

## 2025-03-18 PROCEDURE — 84439 ASSAY OF FREE THYROXINE: CPT | Performed by: STUDENT IN AN ORGANIZED HEALTH CARE EDUCATION/TRAINING PROGRAM

## 2025-03-18 PROCEDURE — 80053 COMPREHEN METABOLIC PANEL: CPT

## 2025-03-18 PROCEDURE — 84443 ASSAY THYROID STIM HORMONE: CPT | Performed by: STUDENT IN AN ORGANIZED HEALTH CARE EDUCATION/TRAINING PROGRAM

## 2025-03-19 DIAGNOSIS — E05.00 GRAVES' DISEASE: Primary | ICD-10-CM

## 2025-03-19 LAB
T3FREE SERPL-MCNC: 3.25 PG/ML (ref 2–4.4)
T4 FREE SERPL-MCNC: 1.02 NG/DL (ref 0.92–1.68)
TSH SERPL DL<=0.05 MIU/L-ACNC: 0.01 UIU/ML (ref 0.27–4.2)

## 2025-03-21 ENCOUNTER — OFFICE VISIT (OUTPATIENT)
Dept: ENDOCRINOLOGY | Age: 85
End: 2025-03-21
Payer: MEDICARE

## 2025-03-21 VITALS
SYSTOLIC BLOOD PRESSURE: 124 MMHG | WEIGHT: 114.6 LBS | OXYGEN SATURATION: 99 % | HEART RATE: 68 BPM | BODY MASS INDEX: 20.96 KG/M2 | TEMPERATURE: 97.6 F | DIASTOLIC BLOOD PRESSURE: 60 MMHG

## 2025-03-21 DIAGNOSIS — E04.1 THYROID NODULE: ICD-10-CM

## 2025-03-21 DIAGNOSIS — E05.00 GRAVES' DISEASE: Primary | ICD-10-CM

## 2025-03-21 NOTE — ASSESSMENT & PLAN NOTE
Asymptomatic  Continue the current dose of methimazole 5 mg daily  Recheck TFT and CMP to trend LFT in 4 weeks
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
## 2025-04-22 ENCOUNTER — LAB (OUTPATIENT)
Facility: HOSPITAL | Age: 85
End: 2025-04-22
Payer: MEDICARE

## 2025-04-22 DIAGNOSIS — E05.00 GRAVES' DISEASE: ICD-10-CM

## 2025-04-22 DIAGNOSIS — E55.9 VITAMIN D DEFICIENCY, UNSPECIFIED: ICD-10-CM

## 2025-04-22 LAB
ALBUMIN SERPL-MCNC: 4.2 G/DL (ref 3.5–5.2)
ALBUMIN/GLOB SERPL: 1.1 G/DL
ALP SERPL-CCNC: 242 U/L (ref 39–117)
ALT SERPL W P-5'-P-CCNC: 60 U/L (ref 1–33)
ANION GAP SERPL CALCULATED.3IONS-SCNC: 9.3 MMOL/L (ref 5–15)
AST SERPL-CCNC: 76 U/L (ref 1–32)
BILIRUB SERPL-MCNC: 0.6 MG/DL (ref 0–1.2)
BUN SERPL-MCNC: 15 MG/DL (ref 8–23)
BUN/CREAT SERPL: 18.5 (ref 7–25)
CALCIUM SPEC-SCNC: 9.8 MG/DL (ref 8.6–10.5)
CHLORIDE SERPL-SCNC: 102 MMOL/L (ref 98–107)
CO2 SERPL-SCNC: 25.7 MMOL/L (ref 22–29)
CREAT SERPL-MCNC: 0.81 MG/DL (ref 0.57–1)
EGFRCR SERPLBLD CKD-EPI 2021: 70.8 ML/MIN/1.73
GLOBULIN UR ELPH-MCNC: 3.8 GM/DL
GLUCOSE SERPL-MCNC: 95 MG/DL (ref 65–99)
POTASSIUM SERPL-SCNC: 4.2 MMOL/L (ref 3.5–5.2)
PROT SERPL-MCNC: 8 G/DL (ref 6–8.5)
SODIUM SERPL-SCNC: 137 MMOL/L (ref 136–145)
T3FREE SERPL-MCNC: 3.48 PG/ML (ref 2–4.4)
T4 FREE SERPL-MCNC: 0.94 NG/DL (ref 0.92–1.68)
TSH SERPL DL<=0.05 MIU/L-ACNC: 0.02 UIU/ML (ref 0.27–4.2)

## 2025-04-22 PROCEDURE — 84443 ASSAY THYROID STIM HORMONE: CPT

## 2025-04-22 PROCEDURE — 84439 ASSAY OF FREE THYROXINE: CPT

## 2025-04-22 PROCEDURE — 80053 COMPREHEN METABOLIC PANEL: CPT

## 2025-04-22 PROCEDURE — 36415 COLL VENOUS BLD VENIPUNCTURE: CPT

## 2025-04-22 PROCEDURE — 84481 FREE ASSAY (FT-3): CPT

## 2025-05-04 DIAGNOSIS — E78.2 MIXED HYPERLIPIDEMIA: ICD-10-CM

## 2025-05-05 RX ORDER — ATORVASTATIN CALCIUM 40 MG/1
40 TABLET, FILM COATED ORAL DAILY
Qty: 90 TABLET | Refills: 1 | Status: SHIPPED | OUTPATIENT
Start: 2025-05-05

## 2025-05-23 ENCOUNTER — LAB (OUTPATIENT)
Facility: HOSPITAL | Age: 85
End: 2025-05-23
Payer: MEDICARE

## 2025-05-23 DIAGNOSIS — E05.00 GRAVES' DISEASE: ICD-10-CM

## 2025-05-23 LAB
ALBUMIN SERPL-MCNC: 4 G/DL (ref 3.5–5.2)
ALBUMIN/GLOB SERPL: 1.1 G/DL
ALP SERPL-CCNC: 207 U/L (ref 39–117)
ALT SERPL W P-5'-P-CCNC: 56 U/L (ref 1–33)
ANION GAP SERPL CALCULATED.3IONS-SCNC: 8.6 MMOL/L (ref 5–15)
AST SERPL-CCNC: 66 U/L (ref 1–32)
BILIRUB SERPL-MCNC: 0.5 MG/DL (ref 0–1.2)
BUN SERPL-MCNC: 13 MG/DL (ref 8–23)
BUN/CREAT SERPL: 18.8 (ref 7–25)
CALCIUM SPEC-SCNC: 9.7 MG/DL (ref 8.6–10.5)
CHLORIDE SERPL-SCNC: 105 MMOL/L (ref 98–107)
CO2 SERPL-SCNC: 26.4 MMOL/L (ref 22–29)
CREAT SERPL-MCNC: 0.69 MG/DL (ref 0.57–1)
EGFRCR SERPLBLD CKD-EPI 2021: 84.6 ML/MIN/1.73
GLOBULIN UR ELPH-MCNC: 3.6 GM/DL
GLUCOSE SERPL-MCNC: 87 MG/DL (ref 65–99)
POTASSIUM SERPL-SCNC: 4.5 MMOL/L (ref 3.5–5.2)
PROT SERPL-MCNC: 7.6 G/DL (ref 6–8.5)
SODIUM SERPL-SCNC: 140 MMOL/L (ref 136–145)
T3FREE SERPL-MCNC: 6.4 PG/ML (ref 2–4.4)
T4 FREE SERPL-MCNC: 1.59 NG/DL (ref 0.92–1.68)
TSH SERPL DL<=0.05 MIU/L-ACNC: <0.005 UIU/ML (ref 0.27–4.2)

## 2025-05-23 PROCEDURE — 84439 ASSAY OF FREE THYROXINE: CPT

## 2025-05-23 PROCEDURE — 80053 COMPREHEN METABOLIC PANEL: CPT

## 2025-05-23 PROCEDURE — 84481 FREE ASSAY (FT-3): CPT

## 2025-05-23 PROCEDURE — 84443 ASSAY THYROID STIM HORMONE: CPT

## 2025-05-23 PROCEDURE — 36415 COLL VENOUS BLD VENIPUNCTURE: CPT

## 2025-06-01 DIAGNOSIS — I10 PRIMARY HYPERTENSION: ICD-10-CM

## 2025-06-02 RX ORDER — AMLODIPINE BESYLATE 5 MG/1
5 TABLET ORAL DAILY
Qty: 90 TABLET | Refills: 1 | Status: SHIPPED | OUTPATIENT
Start: 2025-06-02

## 2025-06-02 RX ORDER — ASPIRIN 81 MG
81 TABLET,CHEWABLE ORAL DAILY
Qty: 90 TABLET | Refills: 1 | Status: SHIPPED | OUTPATIENT
Start: 2025-06-02

## 2025-06-20 DIAGNOSIS — I10 PRIMARY HYPERTENSION: ICD-10-CM

## 2025-06-20 DIAGNOSIS — E78.2 MIXED HYPERLIPIDEMIA: Primary | ICD-10-CM

## 2025-06-20 DIAGNOSIS — R73.9 HYPERGLYCEMIA: ICD-10-CM

## 2025-06-21 LAB
ALBUMIN SERPL-MCNC: 4.1 G/DL (ref 3.5–5.2)
ALBUMIN/GLOB SERPL: 1.2 G/DL
ALP SERPL-CCNC: 206 U/L (ref 39–117)
ALT SERPL-CCNC: 44 U/L (ref 1–33)
AST SERPL-CCNC: 62 U/L (ref 1–32)
BASOPHILS # BLD AUTO: 0.04 10*3/MM3 (ref 0–0.2)
BASOPHILS NFR BLD AUTO: 0.8 % (ref 0–1.5)
BILIRUB SERPL-MCNC: 0.6 MG/DL (ref 0–1.2)
BUN SERPL-MCNC: 12 MG/DL (ref 8–23)
BUN/CREAT SERPL: 17.1 (ref 7–25)
CALCIUM SERPL-MCNC: 9.8 MG/DL (ref 8.6–10.5)
CHLORIDE SERPL-SCNC: 104 MMOL/L (ref 98–107)
CHOLEST SERPL-MCNC: 126 MG/DL (ref 0–200)
CO2 SERPL-SCNC: 24.8 MMOL/L (ref 22–29)
CREAT SERPL-MCNC: 0.7 MG/DL (ref 0.57–1)
EGFRCR SERPLBLD CKD-EPI 2021: 84.3 ML/MIN/1.73
EOSINOPHIL # BLD AUTO: 0.34 10*3/MM3 (ref 0–0.4)
EOSINOPHIL NFR BLD AUTO: 6.8 % (ref 0.3–6.2)
ERYTHROCYTE [DISTWIDTH] IN BLOOD BY AUTOMATED COUNT: 12.4 % (ref 12.3–15.4)
GLOBULIN SER CALC-MCNC: 3.3 GM/DL
GLUCOSE SERPL-MCNC: 89 MG/DL (ref 65–99)
HCT VFR BLD AUTO: 36.4 % (ref 34–46.6)
HDLC SERPL-MCNC: 58 MG/DL (ref 40–60)
HGB BLD-MCNC: 11.9 G/DL (ref 12–15.9)
IMM GRANULOCYTES # BLD AUTO: 0.01 10*3/MM3 (ref 0–0.05)
IMM GRANULOCYTES NFR BLD AUTO: 0.2 % (ref 0–0.5)
LDLC SERPL CALC-MCNC: 54 MG/DL (ref 0–100)
LDLC/HDLC SERPL: 0.95 {RATIO}
LYMPHOCYTES # BLD AUTO: 1.14 10*3/MM3 (ref 0.7–3.1)
LYMPHOCYTES NFR BLD AUTO: 22.8 % (ref 19.6–45.3)
MCH RBC QN AUTO: 29.8 PG (ref 26.6–33)
MCHC RBC AUTO-ENTMCNC: 32.7 G/DL (ref 31.5–35.7)
MCV RBC AUTO: 91.2 FL (ref 79–97)
MONOCYTES # BLD AUTO: 0.44 10*3/MM3 (ref 0.1–0.9)
MONOCYTES NFR BLD AUTO: 8.8 % (ref 5–12)
NEUTROPHILS # BLD AUTO: 3.02 10*3/MM3 (ref 1.7–7)
NEUTROPHILS NFR BLD AUTO: 60.6 % (ref 42.7–76)
NRBC BLD AUTO-RTO: 0 /100 WBC (ref 0–0.2)
PLATELET # BLD AUTO: 241 10*3/MM3 (ref 140–450)
POTASSIUM SERPL-SCNC: 4.7 MMOL/L (ref 3.5–5.2)
PROT SERPL-MCNC: 7.4 G/DL (ref 6–8.5)
RBC # BLD AUTO: 3.99 10*6/MM3 (ref 3.77–5.28)
SODIUM SERPL-SCNC: 139 MMOL/L (ref 136–145)
TRIGL SERPL-MCNC: 64 MG/DL (ref 0–150)
VLDLC SERPL CALC-MCNC: 14 MG/DL (ref 5–40)
WBC # BLD AUTO: 4.99 10*3/MM3 (ref 3.4–10.8)

## 2025-06-24 LAB
GGT SERPL-CCNC: 26 U/L (ref 5–36)
WRITTEN AUTHORIZATION: NORMAL

## 2025-06-25 ENCOUNTER — OFFICE VISIT (OUTPATIENT)
Dept: FAMILY MEDICINE CLINIC | Facility: CLINIC | Age: 85
End: 2025-06-25
Payer: MEDICARE

## 2025-06-25 VITALS
RESPIRATION RATE: 16 BRPM | SYSTOLIC BLOOD PRESSURE: 128 MMHG | HEIGHT: 62 IN | BODY MASS INDEX: 20.8 KG/M2 | DIASTOLIC BLOOD PRESSURE: 60 MMHG | HEART RATE: 62 BPM | TEMPERATURE: 98.6 F | WEIGHT: 113 LBS | OXYGEN SATURATION: 99 %

## 2025-06-25 DIAGNOSIS — D64.9 BORDERLINE ANEMIA: ICD-10-CM

## 2025-06-25 DIAGNOSIS — R74.01 TRANSAMINITIS: ICD-10-CM

## 2025-06-25 DIAGNOSIS — I10 ESSENTIAL (PRIMARY) HYPERTENSION: Primary | ICD-10-CM

## 2025-06-25 DIAGNOSIS — E05.00 GRAVES' DISEASE: ICD-10-CM

## 2025-06-25 DIAGNOSIS — E78.2 MIXED HYPERLIPIDEMIA: ICD-10-CM

## 2025-06-25 RX ORDER — ATORVASTATIN CALCIUM 20 MG/1
20 TABLET, FILM COATED ORAL DAILY
Qty: 90 TABLET | Refills: 1 | Status: SHIPPED | OUTPATIENT
Start: 2025-06-25
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
## 2025-06-26 LAB
ALBUMIN SERPL-MCNC: 4.1 G/DL (ref 3.7–4.7)
ALP SERPL-CCNC: 205 IU/L (ref 44–121)
ALT SERPL-CCNC: 41 IU/L (ref 0–32)
AST SERPL-CCNC: 54 IU/L (ref 0–40)
BILIRUB SERPL-MCNC: 0.8 MG/DL (ref 0–1.2)
BUN SERPL-MCNC: 17 MG/DL (ref 8–27)
BUN/CREAT SERPL: 24 (ref 12–28)
CALCIUM SERPL-MCNC: 9.8 MG/DL (ref 8.7–10.3)
CHLORIDE SERPL-SCNC: 104 MMOL/L (ref 96–106)
CO2 SERPL-SCNC: 20 MMOL/L (ref 20–29)
CREAT SERPL-MCNC: 0.7 MG/DL (ref 0.57–1)
EGFRCR SERPLBLD CKD-EPI 2021: 84 ML/MIN/1.73
GLOBULIN SER CALC-MCNC: 3.5 G/DL (ref 1.5–4.5)
GLUCOSE SERPL-MCNC: 85 MG/DL (ref 70–99)
POTASSIUM SERPL-SCNC: 4.9 MMOL/L (ref 3.5–5.2)
PROT SERPL-MCNC: 7.6 G/DL (ref 6–8.5)
SODIUM SERPL-SCNC: 141 MMOL/L (ref 134–144)
T3FREE SERPL-MCNC: 3.2 PG/ML (ref 2–4.4)
T4 FREE SERPL-MCNC: 0.91 NG/DL (ref 0.82–1.77)
TSH SERPL DL<=0.005 MIU/L-ACNC: 0.01 UIU/ML (ref 0.45–4.5)

## 2025-06-26 NOTE — PATIENT INSTRUCTIONS
Hypertension: borderline, continue amlodipine 5 mg daily and aspirin 81 mg daily.    Hyperlipidemia/Transaminitis: due to AST/ALT 62/44, alk phos 206; GGT done with results in normal range; recommending reducing atorvastatin down to 20 mg daily (from 40 mg). Recommend recheck of labs in 3 months for follow up. Patient to continue to eat a heart healthy diet. If liver enzymes do not improve will order abdominal u/s to further eval liver.    Graves disease: labs done today for Patient's upcoming appt with Dr. Nokhbehzaeim endocrinology. Patient to keep upcoming appt 7/22.     Borderline anemia: H&H 11.9/36.4, continue to monitor

## 2025-07-14 ENCOUNTER — LAB (OUTPATIENT)
Facility: HOSPITAL | Age: 85
End: 2025-07-14
Payer: MEDICARE

## 2025-07-14 DIAGNOSIS — E05.00 GRAVES' DISEASE: ICD-10-CM

## 2025-07-14 LAB — T3FREE SERPL-MCNC: 2.91 PG/ML (ref 2–4.4)

## 2025-07-14 PROCEDURE — 80053 COMPREHEN METABOLIC PANEL: CPT | Performed by: STUDENT IN AN ORGANIZED HEALTH CARE EDUCATION/TRAINING PROGRAM

## 2025-07-14 PROCEDURE — 84439 ASSAY OF FREE THYROXINE: CPT | Performed by: STUDENT IN AN ORGANIZED HEALTH CARE EDUCATION/TRAINING PROGRAM

## 2025-07-14 PROCEDURE — 84443 ASSAY THYROID STIM HORMONE: CPT | Performed by: STUDENT IN AN ORGANIZED HEALTH CARE EDUCATION/TRAINING PROGRAM

## 2025-07-14 PROCEDURE — 36415 COLL VENOUS BLD VENIPUNCTURE: CPT

## 2025-07-14 PROCEDURE — 84481 FREE ASSAY (FT-3): CPT

## 2025-07-15 DIAGNOSIS — E05.00 GRAVES' DISEASE: Primary | ICD-10-CM

## 2025-07-15 LAB
ALBUMIN SERPL-MCNC: 4 G/DL (ref 3.5–5.2)
ALBUMIN/GLOB SERPL: 1 G/DL
ALP SERPL-CCNC: 174 U/L (ref 39–117)
ALT SERPL W P-5'-P-CCNC: 27 U/L (ref 1–33)
ANION GAP SERPL CALCULATED.3IONS-SCNC: 14 MMOL/L (ref 5–15)
AST SERPL-CCNC: 38 U/L (ref 1–32)
BILIRUB SERPL-MCNC: 0.6 MG/DL (ref 0–1.2)
BUN SERPL-MCNC: 19 MG/DL (ref 8–23)
BUN/CREAT SERPL: 27.9 (ref 7–25)
CALCIUM SPEC-SCNC: 9.7 MG/DL (ref 8.6–10.5)
CHLORIDE SERPL-SCNC: 106 MMOL/L (ref 98–107)
CO2 SERPL-SCNC: 20 MMOL/L (ref 22–29)
CREAT SERPL-MCNC: 0.68 MG/DL (ref 0.57–1)
EGFRCR SERPLBLD CKD-EPI 2021: 84.9 ML/MIN/1.73
GLOBULIN UR ELPH-MCNC: 4.2 GM/DL
GLUCOSE SERPL-MCNC: 92 MG/DL (ref 65–99)
POTASSIUM SERPL-SCNC: 4.5 MMOL/L (ref 3.5–5.2)
PROT SERPL-MCNC: 8.2 G/DL (ref 6–8.5)
SODIUM SERPL-SCNC: 140 MMOL/L (ref 136–145)
T4 FREE SERPL-MCNC: 0.85 NG/DL (ref 0.92–1.68)
TSH SERPL DL<=0.05 MIU/L-ACNC: 0.02 UIU/ML (ref 0.27–4.2)

## 2025-07-22 ENCOUNTER — OFFICE VISIT (OUTPATIENT)
Dept: ENDOCRINOLOGY | Age: 85
End: 2025-07-22
Payer: MEDICARE

## 2025-07-22 VITALS
OXYGEN SATURATION: 99 % | BODY MASS INDEX: 21.35 KG/M2 | WEIGHT: 116 LBS | DIASTOLIC BLOOD PRESSURE: 60 MMHG | SYSTOLIC BLOOD PRESSURE: 136 MMHG | HEART RATE: 77 BPM | HEIGHT: 62 IN

## 2025-07-22 DIAGNOSIS — E05.00 GRAVES' DISEASE: Primary | ICD-10-CM

## 2025-07-22 DIAGNOSIS — E04.1 THYROID NODULE: ICD-10-CM

## 2025-07-22 PROCEDURE — 99214 OFFICE O/P EST MOD 30 MIN: CPT | Performed by: STUDENT IN AN ORGANIZED HEALTH CARE EDUCATION/TRAINING PROGRAM

## 2025-07-22 PROCEDURE — G2211 COMPLEX E/M VISIT ADD ON: HCPCS | Performed by: STUDENT IN AN ORGANIZED HEALTH CARE EDUCATION/TRAINING PROGRAM

## 2025-07-22 RX ORDER — METHIMAZOLE 5 MG/1
2.5 TABLET ORAL DAILY
Qty: 45 TABLET | Refills: 1 | Status: SHIPPED | OUTPATIENT
Start: 2025-07-22 | End: 2026-07-22
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
